# Patient Record
Sex: FEMALE | Race: OTHER | HISPANIC OR LATINO
[De-identification: names, ages, dates, MRNs, and addresses within clinical notes are randomized per-mention and may not be internally consistent; named-entity substitution may affect disease eponyms.]

---

## 2021-02-13 ENCOUNTER — TRANSCRIPTION ENCOUNTER (OUTPATIENT)
Age: 31
End: 2021-02-13

## 2021-02-14 ENCOUNTER — INPATIENT (INPATIENT)
Facility: HOSPITAL | Age: 31
LOS: 0 days | Discharge: HOME | DRG: 817 | End: 2021-02-15
Attending: OBSTETRICS & GYNECOLOGY | Admitting: OBSTETRICS & GYNECOLOGY
Payer: COMMERCIAL

## 2021-02-14 ENCOUNTER — RESULT REVIEW (OUTPATIENT)
Age: 31
End: 2021-02-14

## 2021-02-14 VITALS
SYSTOLIC BLOOD PRESSURE: 95 MMHG | TEMPERATURE: 98 F | HEART RATE: 74 BPM | DIASTOLIC BLOOD PRESSURE: 56 MMHG | WEIGHT: 293 LBS | RESPIRATION RATE: 18 BRPM

## 2021-02-14 DIAGNOSIS — K37 UNSPECIFIED APPENDICITIS: ICD-10-CM

## 2021-02-14 DIAGNOSIS — O00.90 UNSPECIFIED ECTOPIC PREGNANCY WITHOUT INTRAUTERINE PREGNANCY: ICD-10-CM

## 2021-02-14 DIAGNOSIS — Z98.890 OTHER SPECIFIED POSTPROCEDURAL STATES: Chronic | ICD-10-CM

## 2021-02-14 LAB
ALBUMIN SERPL ELPH-MCNC: 3.5 G/DL — SIGNIFICANT CHANGE UP (ref 3.5–5)
ALP SERPL-CCNC: 43 U/L — SIGNIFICANT CHANGE UP (ref 40–120)
ALT FLD-CCNC: 33 U/L DA — SIGNIFICANT CHANGE UP (ref 10–60)
ANION GAP SERPL CALC-SCNC: 13 MMOL/L — SIGNIFICANT CHANGE UP (ref 5–17)
APTT BLD: 24.7 SEC — LOW (ref 27.5–35.5)
AST SERPL-CCNC: 9 U/L — LOW (ref 10–40)
BASOPHILS # BLD AUTO: 0.05 K/UL — SIGNIFICANT CHANGE UP (ref 0–0.2)
BASOPHILS NFR BLD AUTO: 0.3 % — SIGNIFICANT CHANGE UP (ref 0–2)
BILIRUB SERPL-MCNC: 0.2 MG/DL — SIGNIFICANT CHANGE UP (ref 0.2–1.2)
BLD GP AB SCN SERPL QL: SIGNIFICANT CHANGE UP
BUN SERPL-MCNC: 14 MG/DL — SIGNIFICANT CHANGE UP (ref 7–18)
CALCIUM SERPL-MCNC: 8 MG/DL — LOW (ref 8.4–10.5)
CHLORIDE SERPL-SCNC: 105 MMOL/L — SIGNIFICANT CHANGE UP (ref 96–108)
CO2 SERPL-SCNC: 19 MMOL/L — LOW (ref 22–31)
CREAT SERPL-MCNC: 1.01 MG/DL — SIGNIFICANT CHANGE UP (ref 0.5–1.3)
EOSINOPHIL # BLD AUTO: 0.17 K/UL — SIGNIFICANT CHANGE UP (ref 0–0.5)
EOSINOPHIL NFR BLD AUTO: 1 % — SIGNIFICANT CHANGE UP (ref 0–6)
GLUCOSE SERPL-MCNC: 136 MG/DL — HIGH (ref 70–99)
HCG SERPL-ACNC: 2017 MIU/ML — HIGH
HCT VFR BLD CALC: 33.5 % — LOW (ref 34.5–45)
HGB BLD-MCNC: 11.3 G/DL — LOW (ref 11.5–15.5)
IMM GRANULOCYTES NFR BLD AUTO: 0.5 % — SIGNIFICANT CHANGE UP (ref 0–1.5)
INR BLD: 1.13 RATIO — SIGNIFICANT CHANGE UP (ref 0.88–1.16)
LYMPHOCYTES # BLD AUTO: 15.1 % — SIGNIFICANT CHANGE UP (ref 13–44)
LYMPHOCYTES # BLD AUTO: 2.56 K/UL — SIGNIFICANT CHANGE UP (ref 1–3.3)
MCHC RBC-ENTMCNC: 31.1 PG — SIGNIFICANT CHANGE UP (ref 27–34)
MCHC RBC-ENTMCNC: 33.7 GM/DL — SIGNIFICANT CHANGE UP (ref 32–36)
MCV RBC AUTO: 92.3 FL — SIGNIFICANT CHANGE UP (ref 80–100)
MONOCYTES # BLD AUTO: 0.84 K/UL — SIGNIFICANT CHANGE UP (ref 0–0.9)
MONOCYTES NFR BLD AUTO: 5 % — SIGNIFICANT CHANGE UP (ref 2–14)
NEUTROPHILS # BLD AUTO: 13.24 K/UL — HIGH (ref 1.8–7.4)
NEUTROPHILS NFR BLD AUTO: 78.1 % — HIGH (ref 43–77)
NRBC # BLD: 0 /100 WBCS — SIGNIFICANT CHANGE UP (ref 0–0)
PLATELET # BLD AUTO: 355 K/UL — SIGNIFICANT CHANGE UP (ref 150–400)
POTASSIUM SERPL-MCNC: 3.7 MMOL/L — SIGNIFICANT CHANGE UP (ref 3.5–5.3)
POTASSIUM SERPL-SCNC: 3.7 MMOL/L — SIGNIFICANT CHANGE UP (ref 3.5–5.3)
PROT SERPL-MCNC: 6.8 G/DL — SIGNIFICANT CHANGE UP (ref 6–8.3)
PROTHROM AB SERPL-ACNC: 13.4 SEC — SIGNIFICANT CHANGE UP (ref 10.6–13.6)
RBC # BLD: 3.63 M/UL — LOW (ref 3.8–5.2)
RBC # FLD: 12.7 % — SIGNIFICANT CHANGE UP (ref 10.3–14.5)
SARS-COV-2 RNA SPEC QL NAA+PROBE: SIGNIFICANT CHANGE UP
SODIUM SERPL-SCNC: 137 MMOL/L — SIGNIFICANT CHANGE UP (ref 135–145)
WBC # BLD: 16.95 K/UL — HIGH (ref 3.8–10.5)
WBC # FLD AUTO: 16.95 K/UL — HIGH (ref 3.8–10.5)

## 2021-02-14 PROCEDURE — 76817 TRANSVAGINAL US OBSTETRIC: CPT | Mod: 26

## 2021-02-14 PROCEDURE — 76801 OB US < 14 WKS SINGLE FETUS: CPT | Mod: 26

## 2021-02-14 PROCEDURE — 88305 TISSUE EXAM BY PATHOLOGIST: CPT | Mod: 26

## 2021-02-14 PROCEDURE — 99222 1ST HOSP IP/OBS MODERATE 55: CPT

## 2021-02-14 PROCEDURE — 99291 CRITICAL CARE FIRST HOUR: CPT

## 2021-02-14 RX ORDER — KETOROLAC TROMETHAMINE 30 MG/ML
30 SYRINGE (ML) INJECTION ONCE
Refills: 0 | Status: DISCONTINUED | OUTPATIENT
Start: 2021-02-14 | End: 2021-02-14

## 2021-02-14 RX ORDER — ACETAMINOPHEN 500 MG
1000 TABLET ORAL ONCE
Refills: 0 | Status: COMPLETED | OUTPATIENT
Start: 2021-02-14 | End: 2021-02-14

## 2021-02-14 RX ORDER — ONDANSETRON 8 MG/1
4 TABLET, FILM COATED ORAL ONCE
Refills: 0 | Status: COMPLETED | OUTPATIENT
Start: 2021-02-14 | End: 2021-02-14

## 2021-02-14 RX ORDER — SODIUM CHLORIDE 9 MG/ML
2000 INJECTION INTRAMUSCULAR; INTRAVENOUS; SUBCUTANEOUS ONCE
Refills: 0 | Status: COMPLETED | OUTPATIENT
Start: 2021-02-14 | End: 2021-02-14

## 2021-02-14 RX ADMIN — Medication 30 MILLIGRAM(S): at 22:22

## 2021-02-14 RX ADMIN — SODIUM CHLORIDE 4000 MILLILITER(S): 9 INJECTION INTRAMUSCULAR; INTRAVENOUS; SUBCUTANEOUS at 20:31

## 2021-02-14 RX ADMIN — ONDANSETRON 4 MILLIGRAM(S): 8 TABLET, FILM COATED ORAL at 21:43

## 2021-02-14 RX ADMIN — Medication 400 MILLIGRAM(S): at 22:22

## 2021-02-14 NOTE — ED ADULT NURSE NOTE - NSIMPLEMENTINTERV_GEN_ALL_ED
From: Juan Arriola  To: Bria Duke MD  Sent: 2/12/2020 10:08 AM [facility]  Subject: Medication Question    Hello -     I am contacting you because I AM OUT OF MY MEDICATION.     I contacted my pharmacy about refilling my Adderall XR 30 mg prescription and they have NOT received a refill request. I asked them to double-check their records before I contacted you and they were certain they have not received it. Can you please call in a refill request for my monthly Adderall XR 30 mg medication that I take once per day?     The [facility] pharmacy I use is located at [address] [place] [address], [facility], and their phone number is [phone].   Implemented All Universal Safety Interventions:  [place] to call system. Call bell, personal items and telephone within reach. Instruct patient to call for assistance. Room bathroom lighting operational. Non-slip footwear when patient is off stretcher. Physically safe environment: no spills, clutter or unnecessary equipment. Stretcher in lowest position, wheels locked, appropriate side rails in place.

## 2021-02-14 NOTE — CONSULT NOTE ADULT - SUBJECTIVE AND OBJECTIVE BOX
Patient is a 30y old  Female who presents with a chief complaint of     HPI  Called see and fei 30y.o. Female w/no significant PMH for acute abdomen r/o appendicitis. Pt presented to [facility] ER c/o worsening abdominal pain since last night. Pt states she was painting yesterday and had mozzarella sticks for dinner. When she returned home and slept, she began experiencing mild R flank pain, now more in RLQ. Associated with nausea without vomiting. Urinating normally. Last BM before coming to ER, loose. Denies fever, chills, consumption of spoiled/rotten food, previous episodes of similar symptoms. LMP about 1 month ago.     PAST MEDICAL & SURGICAL HISTORY:  Torn R wrist ligament    MEDICATIONS  (STANDING):  ondansetron Injectable 4 milliGRAM(s) IV Push once    Allergy Status Unknown    Vital Signs Last 24 Hrs  T(C): 36.7 (14 Feb 2021 18:58), Max: 36.7 (14 Feb 2021 18:58)  T(F): 98 (14 Feb 2021 18:58), Max: 98 (14 Feb 2021 18:58)  HR: 74 (14 Feb 2021 18:58) (74 - 74)  BP: 95/56 (14 Feb 2021 18:58) (95/56 - 95/56)  BP(mean): --  RR: 18 (14 Feb 2021 18:58) (18 - 18)  SpO2: --    Physical:  Gen: A&Ox3.   Abd: Soft obese, diffuse tenderness on light palpation. +rebound tenderness. No guarding or rigidity.    LABS:                        11.3   16.95 )-----------( 355      ( 14 Feb 2021 19:22 )             33.5              02-14    137  |  105  |  14  ----------------------------<  136<H>  3.7   |  19<L>  |  1.01    Ca    8.0<L>      14 Feb 2021 19:22    TPro  6.8  /  Alb  3.5  /  TBili  0.2  /  DBili  x   /  AST  9<L>  /  ALT  33  /  AlkPhos  43  02-14            PT/INR - ( 14 Feb 2021 19:22 )   PT: 13.4 sec;   INR: 1.13 ratio    PTT - ( 14 Feb 2021 19:22 )  PTT:24.7 sec    RADIOLOGY & ADDITIONAL STUDIES:  < from: US Transvaginal (02.14.21 @ 20:16) >  No intrauterine or extrauterine gestation identified. Nonspecific small subcentimeter left paraovarian cyst. Differential diagnosis includes early normal pregnancy, occult ectopic pregnancy and failed pregnancy. Continued trending of hCG and short-term follow-up imaging is recommended .    < end of copied text >    
29yo p2  developed acute onset rlq pain...describes it as severe  hx chlamydia in the past  menses reg  [facility]  sono=no iug seen  wanted pregnancy    abdomen is nondistended but there is rebound tenderness that is severe and located in the rlq.  there is tenderness to mild palpation rlq  pelvic exam:  no bleeding  severe cmt noted  markedly tender r adnexa=unable to evaluate further due to pain.  sono did not report free fluid.    wbc=16  afebrile  no cva tenderness    ASSESEMENT:  ACUTE ONSET RLQ PAIN, 5 WEEKS GESTATION WITH ACCURATE DATES  [facility] [place] NO VISUALIZ[place]ION OF IUG  SEVERE RLQ PAIN  WBC 16  SHE DENIES ANOREXIA. ABD PAIN DID NOT START IN PERIUMBILICAL AREA    PLAN:  BASED ON SYMPTOMS AND CLINICAL EXAMINATION PATIENT LIKELY HAS AN ECTOPIC PREGNANCY OR APPENDICITIS. DUE TO DESIRE TO KEEP PREGNANCY A CT SCAN IS CONRTRAINDICATED. I COUNSELED PT ON FINDINGS SIGNIFICANCE MGMT OPTIONS AND RESPECTIVE PROS CONS RISKS LIMITATIONS.  PLAN IS TO PROCEED TO LAPAROSCOPY. IF ECTOPIC=SALPINGECTOMY, IF APPENDICITIS=APPENDECTOMY.  A SURGICAL CONSULT REQUESTED PREOP.  INFORMED CONSENT OBTAINED FROM PATIENT AFTER ALL QUESTIONS ANSWERED TO HER SATISFACTION.

## 2021-02-14 NOTE — CONSULT NOTE ADULT - ASSESSMENT
30 y.o. F with acute abdomen, r/o acute appendicitis    -Unable to obtain CT due to risk to fetus. Pt wants to continue with pregnancy  -MRI unavailable at present time  -OB to take pt to OR for dx lap. Will be on board in case of appendicitis finding  -Keep NPO  -IVF  -Empiric abx  -Pain control prn  -Antiemesis  -COVID testing

## 2021-02-14 NOTE — H&P ADULT - HISTORY OF PRESENT ILLNESS
29yo  LMP 2021 EGA 5.1wks presents to ER after LOC at home. Pt states she had RLQ pain started last night which was mild but today at 4pm increased in intensity 10/10 in severity radiating to her back. Pt denies any vb, vaginal discharge; pelvic pain, cp, sob, dizziness, palpitations, n/v/d/c, fever; chills     pob/gynhx:  x2; sab x1; +chlamy tx'd  no abn pap smear; no fibroids; reg menses  pmhx: Anxiety    pshx: right wrist sx  all: nka  meds: none  sochx: no etoh/drug/tobacco use    REVIEW OF SYSTEMS: see HPI	    PE:  Vital Signs Last 24 Hrs  T(C): 36.7 (2021 18:58), Max: 36.7 (2021 18:58)  T(F): 98 (2021 18:58), Max: 98 (2021 18:58)  HR: 74 (2021 18:58) (74 - 74)  BP: 95/56 (2021 18:58) (95/56 - 95/56)  BP(mean): --  RR: 18 (2021 18:58) (18 - 18)  SpO2: --    abd: +bs; soft, +tenderness to light and deep palpation, ND, +rebound, no guarding  pelvis:  done by Dr Rucker; no vb observed    LABS:                        11.3   16.95 )-----------( 355      ( 2021 19:22 )             33.5     02-14    137  |  105  |  14  ----------------------------<  136<H>  3.7   |  19<L>  |  1.01    Ca    8.0<L>      2021 19:22    TPro  6.8  /  Alb  3.5  /  TBili  0.2  /  DBili  x   /  AST  9<L>  /  ALT  33  /  AlkPhos  43  02-14    PT/INR - ( 2021 19:22 )   PT: 13.4 sec;   INR: 1.13 ratio         PTT - ( 2021 19:22 )  PTT:24.7 sec    HCG Quantitative, Serum (21 @ 19:22)    HCG Quantitative, Serum: 2017: HCG-Quantitative test interpretations: This test is intended only for the  detection & monitoring of pregnancy. For oncology studies order the tumor  marker test “HCG-TM” (hCG-Tumor Marker).  For pregnancy evaluation the reference values are as follows:  Negative:  <=4 mIU/mL  Indeterminate:  5 - 25 mIU/mL (suggest repeat testing in 72 hours)  Positive:  > 25 mIU/mL  Reference Values during Pregnancy:  Weeks after LMP* REFERENCE INTERVAL mIU/mL     3      6 - 71     4      10 - 750     5      220 - 7,100     6      160 - 32,000     7      3,700 - 164,000     8      32,000 - 150,000     9      64,000 - 151,000     10      47,000 - 187,000     12      28,000 - 211,000     14      14,000 - 63,000     15      12,000 - 71,000     16 - 18  8,000 - 58,000  * LMP:  Last Menstrual Period  HCG results of false positive and false negative for pregnancy are rare,  but can occur with this, and other, hCG tests. Lina- and post-menopausal  females may have mildly elevated hCG concentrations usually less than 14  mIU/mL that are constant over time. mIU/mL        RADIOLOGY & ADDITIONAL STUDIES:  sono    < from: US Transvaginal (21 @ 20:16) >  EXAM:  US TRANSVAGINAL                          EXAM:  US OB LES THAN 14 WKS 1ST GEST                            PROCEDURE DATE:  2021          INTERPRETATION:  CLINICAL INFORMATION: Lower abdominal pain, pregnant. Evaluate for ectopic pregnancy. Beta hCG equals 2017.    LMP: 2021    Estimated Gestational Age by LMP: 5 weeks 1 day    COMPARISON: 2011    Endovaginal and transabdominal pelvic sonogram.    FINDINGS:  Uterus: 10.6 x 6.8 x 6.5 cm. Endometrium measures 9 mm in thickness. No intrauterine gestational sac is identified. Small nabothian cyst noted in the cervix.    Right ovary: 2.8 x 2.0 x 2.2 cm. Within normal limits. Normal arterial and venous waveforms.  Left ovary: 3.2 x 1.8 x 1.7 cm. 1.4 cm corpus luteum. 0.7 cm paraovarian cyst.    Fluid: None.    IMPRESSION:  No intrauterine or extrauterine gestation identified. Nonspecific small subcentimeter left paraovarian cyst. Differential diagnosis includes early normal pregnancy, occult ectopic pregnancy and failed pregnancy. Continued trending of hCG and short-term follow-up imaging is recommended .      < end of copied text >

## 2021-02-14 NOTE — ED PROVIDER NOTE - OBJECTIVE STATEMENT
30 y.o female with no PMhx and no PSHx , , unknown LMP , approximately 2021, reports to the ED c.o R sided abdominal pain since yesterday. patient states she went to urgent care today and diagnosed with UTI, patient states she stood up at home and passed out . Patient endorses abdominal pain has worsened. Patient denies any other acute complaints. NKDA

## 2021-02-14 NOTE — CONSULT NOTE ADULT - ATTENDING COMMENTS
I WROTE THIS NOTE
plan for the OR by GYN  Had gone to the operating room to r/o AP  Intraop - ruptured ectopic

## 2021-02-14 NOTE — H&P ADULT - PROBLEM SELECTOR PLAN 2
admit to GYN  obtain surgical consult  no CT 2/2 pt wanting pregnancy   consent obtained in chart pt agrees  Dr Rucker at bedside examined patient   OR team notified by nursing supervisor  anesthesia aware

## 2021-02-14 NOTE — ED ADULT NURSE NOTE - CHIEF COMPLAINT QUOTE
" I have the lower abdominal pain from today  I had the positive pregnancy test  I passed out at home "

## 2021-02-14 NOTE — H&P ADULT - ASSESSMENT
31yo  @5.1wks by LMP strongly desires pregnancy w RLQ pain; has an acute abdomen; r/o ectopic vs acute appendicitis

## 2021-02-14 NOTE — ED ADULT TRIAGE NOTE - CHIEF COMPLAINT QUOTE
" I have the lower abdominal pain from today  I had the positive pregnancy test " " I have the lower abdominal pain from today  I had the positive pregnancy test  I passed out at home "

## 2021-02-15 ENCOUNTER — TRANSCRIPTION ENCOUNTER (OUTPATIENT)
Age: 31
End: 2021-02-15

## 2021-02-15 VITALS
HEART RATE: 103 BPM | RESPIRATION RATE: 17 BRPM | TEMPERATURE: 100 F | OXYGEN SATURATION: 100 % | SYSTOLIC BLOOD PRESSURE: 103 MMHG | DIASTOLIC BLOOD PRESSURE: 64 MMHG

## 2021-02-15 DIAGNOSIS — D62 ACUTE POSTHEMORRHAGIC ANEMIA: ICD-10-CM

## 2021-02-15 DIAGNOSIS — Z98.890 OTHER SPECIFIED POSTPROCEDURAL STATES: ICD-10-CM

## 2021-02-15 LAB
ANION GAP SERPL CALC-SCNC: 10 MMOL/L — SIGNIFICANT CHANGE UP (ref 5–17)
BASOPHILS # BLD AUTO: 0.01 K/UL — SIGNIFICANT CHANGE UP (ref 0–0.2)
BASOPHILS NFR BLD AUTO: 0.1 % — SIGNIFICANT CHANGE UP (ref 0–2)
BUN SERPL-MCNC: 5 MG/DL — LOW (ref 7–18)
CALCIUM SERPL-MCNC: 7.2 MG/DL — LOW (ref 8.4–10.5)
CHLORIDE SERPL-SCNC: 109 MMOL/L — HIGH (ref 96–108)
CO2 SERPL-SCNC: 20 MMOL/L — LOW (ref 22–31)
CREAT SERPL-MCNC: 0.77 MG/DL — SIGNIFICANT CHANGE UP (ref 0.5–1.3)
EOSINOPHIL # BLD AUTO: 0 K/UL — SIGNIFICANT CHANGE UP (ref 0–0.5)
EOSINOPHIL NFR BLD AUTO: 0 % — SIGNIFICANT CHANGE UP (ref 0–6)
GLUCOSE SERPL-MCNC: 144 MG/DL — HIGH (ref 70–99)
HCT VFR BLD CALC: 24.8 % — LOW (ref 34.5–45)
HCT VFR BLD CALC: 25.9 % — LOW (ref 34.5–45)
HGB BLD-MCNC: 8.7 G/DL — LOW (ref 11.5–15.5)
HGB BLD-MCNC: 8.9 G/DL — LOW (ref 11.5–15.5)
IMM GRANULOCYTES NFR BLD AUTO: 0.5 % — SIGNIFICANT CHANGE UP (ref 0–1.5)
LYMPHOCYTES # BLD AUTO: 1.08 K/UL — SIGNIFICANT CHANGE UP (ref 1–3.3)
LYMPHOCYTES # BLD AUTO: 10 % — LOW (ref 13–44)
MCHC RBC-ENTMCNC: 31.1 PG — SIGNIFICANT CHANGE UP (ref 27–34)
MCHC RBC-ENTMCNC: 31.9 PG — SIGNIFICANT CHANGE UP (ref 27–34)
MCHC RBC-ENTMCNC: 34.4 GM/DL — SIGNIFICANT CHANGE UP (ref 32–36)
MCHC RBC-ENTMCNC: 35.1 GM/DL — SIGNIFICANT CHANGE UP (ref 32–36)
MCV RBC AUTO: 90.6 FL — SIGNIFICANT CHANGE UP (ref 80–100)
MCV RBC AUTO: 90.8 FL — SIGNIFICANT CHANGE UP (ref 80–100)
MONOCYTES # BLD AUTO: 0.41 K/UL — SIGNIFICANT CHANGE UP (ref 0–0.9)
MONOCYTES NFR BLD AUTO: 3.8 % — SIGNIFICANT CHANGE UP (ref 2–14)
NEUTROPHILS # BLD AUTO: 9.21 K/UL — HIGH (ref 1.8–7.4)
NEUTROPHILS NFR BLD AUTO: 85.6 % — HIGH (ref 43–77)
NRBC # BLD: 0 /100 WBCS — SIGNIFICANT CHANGE UP (ref 0–0)
NRBC # BLD: 0 /100 WBCS — SIGNIFICANT CHANGE UP (ref 0–0)
PLATELET # BLD AUTO: 191 K/UL — SIGNIFICANT CHANGE UP (ref 150–400)
PLATELET # BLD AUTO: 200 K/UL — SIGNIFICANT CHANGE UP (ref 150–400)
POTASSIUM SERPL-MCNC: 3.4 MMOL/L — LOW (ref 3.5–5.3)
POTASSIUM SERPL-SCNC: 3.4 MMOL/L — LOW (ref 3.5–5.3)
RBC # BLD: 2.73 M/UL — LOW (ref 3.8–5.2)
RBC # BLD: 2.86 M/UL — LOW (ref 3.8–5.2)
RBC # FLD: 13.2 % — SIGNIFICANT CHANGE UP (ref 10.3–14.5)
RBC # FLD: 13.7 % — SIGNIFICANT CHANGE UP (ref 10.3–14.5)
SARS-COV-2 IGG SERPL QL IA: POSITIVE
SARS-COV-2 IGM SERPL IA-ACNC: 7.96 INDEX — HIGH
SODIUM SERPL-SCNC: 139 MMOL/L — SIGNIFICANT CHANGE UP (ref 135–145)
WBC # BLD: 10.76 K/UL — HIGH (ref 3.8–10.5)
WBC # BLD: 11.41 K/UL — HIGH (ref 3.8–10.5)
WBC # FLD AUTO: 10.76 K/UL — HIGH (ref 3.8–10.5)
WBC # FLD AUTO: 11.41 K/UL — HIGH (ref 3.8–10.5)

## 2021-02-15 PROCEDURE — 86769 SARS-COV-2 COVID-19 ANTIBODY: CPT

## 2021-02-15 PROCEDURE — 85610 PROTHROMBIN TIME: CPT

## 2021-02-15 PROCEDURE — U0005: CPT

## 2021-02-15 PROCEDURE — 86901 BLOOD TYPING SEROLOGIC RH(D): CPT

## 2021-02-15 PROCEDURE — 80048 BASIC METABOLIC PNL TOTAL CA: CPT

## 2021-02-15 PROCEDURE — 80053 COMPREHEN METABOLIC PANEL: CPT

## 2021-02-15 PROCEDURE — P9040: CPT

## 2021-02-15 PROCEDURE — 86850 RBC ANTIBODY SCREEN: CPT

## 2021-02-15 PROCEDURE — 85025 COMPLETE CBC W/AUTO DIFF WBC: CPT

## 2021-02-15 PROCEDURE — 93005 ELECTROCARDIOGRAM TRACING: CPT

## 2021-02-15 PROCEDURE — 36430 TRANSFUSION BLD/BLD COMPNT: CPT

## 2021-02-15 PROCEDURE — 99285 EMERGENCY DEPT VISIT HI MDM: CPT | Mod: 25

## 2021-02-15 PROCEDURE — 85027 COMPLETE CBC AUTOMATED: CPT

## 2021-02-15 PROCEDURE — 85730 THROMBOPLASTIN TIME PARTIAL: CPT

## 2021-02-15 PROCEDURE — 76830 TRANSVAGINAL US NON-OB: CPT

## 2021-02-15 PROCEDURE — 86923 COMPATIBILITY TEST ELECTRIC: CPT

## 2021-02-15 PROCEDURE — 76801 OB US < 14 WKS SINGLE FETUS: CPT

## 2021-02-15 PROCEDURE — 86900 BLOOD TYPING SEROLOGIC ABO: CPT

## 2021-02-15 PROCEDURE — 36415 COLL VENOUS BLD VENIPUNCTURE: CPT

## 2021-02-15 PROCEDURE — 87635 SARS-COV-2 COVID-19 AMP PRB: CPT

## 2021-02-15 PROCEDURE — 88305 TISSUE EXAM BY PATHOLOGIST: CPT

## 2021-02-15 PROCEDURE — 84702 CHORIONIC GONADOTROPIN TEST: CPT

## 2021-02-15 RX ORDER — HYDROMORPHONE HYDROCHLORIDE 2 MG/ML
1 INJECTION INTRAMUSCULAR; INTRAVENOUS; SUBCUTANEOUS
Refills: 0 | Status: DISCONTINUED | OUTPATIENT
Start: 2021-02-15 | End: 2021-02-15

## 2021-02-15 RX ORDER — HYDROMORPHONE HYDROCHLORIDE 2 MG/ML
0.5 INJECTION INTRAMUSCULAR; INTRAVENOUS; SUBCUTANEOUS
Refills: 0 | Status: DISCONTINUED | OUTPATIENT
Start: 2021-02-15 | End: 2021-02-15

## 2021-02-15 RX ORDER — SIMETHICONE 80 MG/1
1 TABLET, CHEWABLE ORAL
Qty: 14 | Refills: 0
Start: 2021-02-15 | End: 2021-02-21

## 2021-02-15 RX ORDER — FERROUS SULFATE 325(65) MG
1 TABLET ORAL
Qty: 60 | Refills: 0
Start: 2021-02-15 | End: 2021-03-16

## 2021-02-15 RX ORDER — PANTOPRAZOLE SODIUM 20 MG/1
40 TABLET, DELAYED RELEASE ORAL ONCE
Refills: 0 | Status: COMPLETED | OUTPATIENT
Start: 2021-02-15 | End: 2021-02-15

## 2021-02-15 RX ORDER — ACETAMINOPHEN 500 MG
975 TABLET ORAL EVERY 6 HOURS
Refills: 0 | Status: DISCONTINUED | OUTPATIENT
Start: 2021-02-15 | End: 2021-02-15

## 2021-02-15 RX ORDER — POTASSIUM CHLORIDE 20 MEQ
40 PACKET (EA) ORAL ONCE
Refills: 0 | Status: COMPLETED | OUTPATIENT
Start: 2021-02-15 | End: 2021-02-15

## 2021-02-15 RX ORDER — IBUPROFEN 200 MG
1 TABLET ORAL
Qty: 120 | Refills: 0
Start: 2021-02-15 | End: 2021-03-16

## 2021-02-15 RX ORDER — SIMETHICONE 80 MG/1
80 TABLET, CHEWABLE ORAL EVERY 6 HOURS
Refills: 0 | Status: DISCONTINUED | OUTPATIENT
Start: 2021-02-15 | End: 2021-02-15

## 2021-02-15 RX ORDER — ONDANSETRON 8 MG/1
4 TABLET, FILM COATED ORAL ONCE
Refills: 0 | Status: DISCONTINUED | OUTPATIENT
Start: 2021-02-15 | End: 2021-02-15

## 2021-02-15 RX ORDER — FERROUS SULFATE 325(65) MG
325 TABLET ORAL
Refills: 0 | Status: DISCONTINUED | OUTPATIENT
Start: 2021-02-15 | End: 2021-02-15

## 2021-02-15 RX ORDER — ASCORBIC ACID 60 MG
1 TABLET,CHEWABLE ORAL
Qty: 30 | Refills: 0
Start: 2021-02-15 | End: 2021-03-16

## 2021-02-15 RX ORDER — SODIUM CHLORIDE 9 MG/ML
1000 INJECTION, SOLUTION INTRAVENOUS
Refills: 0 | Status: DISCONTINUED | OUTPATIENT
Start: 2021-02-15 | End: 2021-02-15

## 2021-02-15 RX ORDER — ONDANSETRON 8 MG/1
4 TABLET, FILM COATED ORAL EVERY 4 HOURS
Refills: 0 | Status: DISCONTINUED | OUTPATIENT
Start: 2021-02-15 | End: 2021-02-15

## 2021-02-15 RX ORDER — IBUPROFEN 200 MG
600 TABLET ORAL EVERY 6 HOURS
Refills: 0 | Status: DISCONTINUED | OUTPATIENT
Start: 2021-02-15 | End: 2021-02-15

## 2021-02-15 RX ORDER — KETOROLAC TROMETHAMINE 30 MG/ML
30 SYRINGE (ML) INJECTION ONCE
Refills: 0 | Status: DISCONTINUED | OUTPATIENT
Start: 2021-02-15 | End: 2021-02-15

## 2021-02-15 RX ORDER — MORPHINE SULFATE 50 MG/1
4 CAPSULE, EXTENDED RELEASE ORAL EVERY 4 HOURS
Refills: 0 | Status: DISCONTINUED | OUTPATIENT
Start: 2021-02-15 | End: 2021-02-15

## 2021-02-15 RX ORDER — KETOROLAC TROMETHAMINE 30 MG/ML
30 SYRINGE (ML) INJECTION EVERY 6 HOURS
Refills: 0 | Status: DISCONTINUED | OUTPATIENT
Start: 2021-02-15 | End: 2021-02-15

## 2021-02-15 RX ORDER — SENNOSIDES/DOCUSATE SODIUM 8.6MG-50MG
2 TABLET ORAL
Qty: 60 | Refills: 0
Start: 2021-02-15 | End: 2021-03-16

## 2021-02-15 RX ORDER — ZOLPIDEM TARTRATE 10 MG/1
5 TABLET ORAL AT BEDTIME
Refills: 0 | Status: DISCONTINUED | OUTPATIENT
Start: 2021-02-15 | End: 2021-02-15

## 2021-02-15 RX ADMIN — Medication 30 MILLIGRAM(S): at 01:43

## 2021-02-15 RX ADMIN — MORPHINE SULFATE 4 MILLIGRAM(S): 50 CAPSULE, EXTENDED RELEASE ORAL at 02:55

## 2021-02-15 RX ADMIN — Medication 325 MILLIGRAM(S): at 10:42

## 2021-02-15 RX ADMIN — Medication 975 MILLIGRAM(S): at 10:42

## 2021-02-15 RX ADMIN — PANTOPRAZOLE SODIUM 40 MILLIGRAM(S): 20 TABLET, DELAYED RELEASE ORAL at 10:42

## 2021-02-15 RX ADMIN — Medication 30 MILLIGRAM(S): at 13:35

## 2021-02-15 RX ADMIN — SIMETHICONE 80 MILLIGRAM(S): 80 TABLET, CHEWABLE ORAL at 11:17

## 2021-02-15 RX ADMIN — Medication 600 MILLIGRAM(S): at 06:46

## 2021-02-15 RX ADMIN — SIMETHICONE 80 MILLIGRAM(S): 80 TABLET, CHEWABLE ORAL at 13:36

## 2021-02-15 RX ADMIN — Medication 40 MILLIEQUIVALENT(S): at 13:36

## 2021-02-15 NOTE — CHART NOTE - NSCHARTNOTEFT_GEN_A_CORE
Pt seen at bedside c/o back spasms. Denies n/v, cp; sob; palpitations; or dizziness    T(C): 37.2 (02-15-21 @ 05:42), Max: 37.5 (02-15-21 @ 02:32)  HR: 107 (02-15-21 @ 05:42) (74 - 107)  BP: 95/57 (02-15-21 @ 05:42) (90/51 - 108/47)  RR: 18 (02-15-21 @ 05:42) (18 - 24)  SpO2: 100% (02-15-21 @ 05:42) (98% - 100%)    ; Hemoperitoneum 2000    abd: soft/nt, band  aids in place C/D/I  pelvic: no vb  ext: venodynes in place; no calf pain    a/p POD #0 s/p exploratory laparotomy removal of right salpingectomy and evacuation of hemoperitoneum  s/p 2units PRBC  f/u cbc @6am post transfusion  cont close monitor   cont post op care  d/w  [place]

## 2021-02-15 NOTE — DISCHARGE NOTE PROVIDER - CARE PROVIDER_API CALL
Ever Rucker)  Obstetrics and Gynecology  [id number] [address]  Phone: [phone]  Fax: [phone]  Follow Up Time:

## 2021-02-15 NOTE — DISCHARGE NOTE NURSING/CASE MANAGEMENT/SOCIAL WORK - NSDCPNINST_GEN_ALL_CORE
may remove the dressing in 48 hours after surgery, leave the white steristrips underneath, it will fall out by itself until seen by Surgeon. May wet during shower. No heavy Lifting and Pushing. Notify Surgeon if you started having fever, chills, severe pain not being relieved with pain medication, excessive vaginal bleeding. Follow up with Surgeon.  may remove the dressing tomorrow leave the white steristrips underneath, it will fall out by itself until seen by Surgeon. May wet during shower. No heavy Lifting and Pushing. Notify Surgeon if you started having fever, chills, severe pain not being relieved with pain medication, excessive vaginal bleeding. Follow up with Surgeon.

## 2021-02-15 NOTE — DISCHARGE NOTE PROVIDER - NSDCMRMEDTOKEN_GEN_ALL_CORE_FT
ferrous sulfate 325 mg (65 mg elemental iron) oral tablet: 1 tab(s) orally 2 times a day   Gas-X Ultra Softgels 180 mg oral capsule: 1 cap(s) orally 2 times a day   ibuprofen 600 mg oral tablet: 1 tab(s) orally every 6 hours   Lina-Colace 50 mg-8.6 mg oral tablet: 2 tab(s) orally once a day (at bedtime)   Vitamin C 500 mg oral tablet: 1 tab(s) orally once a day

## 2021-02-15 NOTE — CHART NOTE - NSCHARTNOTEFT_GEN_A_CORE
GYN PA Focused POD#0    Patient offers no complaints at this time. tolerating regular diet, voiding.   h/h 8.7/24.8   K 3.4    plan  give KCl 40meq PO  dc home  f.u with Dr. Rucker in 2 weeks for postop check  discharge instructions verbalized to the patient and understood by the patient  suma Rucker

## 2021-02-15 NOTE — DISCHARGE NOTE PROVIDER - HOSPITAL COURSE
Patient presented to the ER with RLQ and hypotension. Found to be pregnant  taken to OR for diagnostic laparoscopy, found to have blood in her abdomen and due to ruptured ectopic pregnancy  s/p right salpingectomy, removal of R ectopic, evacuation of hemoperitoneum  with acute blood loss anemia s/p blood transfusion intraop   postop stable

## 2021-02-15 NOTE — DISCHARGE NOTE PROVIDER - NSDCCPCAREPLAN_GEN_ALL_CORE_FT
PRINCIPAL DISCHARGE DIAGNOSIS  Diagnosis: Ectopic pregnancy  Assessment and Plan of Treatment:

## 2021-02-15 NOTE — DISCHARGE NOTE NURSING/CASE MANAGEMENT/SOCIAL WORK - PATIENT PORTAL LINK FT
You can access the FollowMyHealth Patient Portal offered by [facility] by registering at the following website: [email or web address]. By joining [employer]’s FollowMyHealth portal, you will also be able to view your health information using other applications (apps) compatible with our system.

## 2021-02-15 NOTE — DISCHARGE NOTE PROVIDER - NSDCFUADDAPPT_GEN_ALL_CORE_FT
no sex nothing in vagina no heavy lifting no pushing eat high fiber food ambulation daily as tolerated shower daily clean wound well and keep dry after; see your gynecologist in 2wks for follow up   no sex nothing in vagina no heavy lifting no pushing eat high fiber food ambulation daily as tolerated shower daily clean wound well and keep dry after; see your gynecologist in 2wks for follow up  remove dressing tomorrow morning

## 2021-02-15 NOTE — DISCHARGE NOTE PROVIDER - NSDCCPTREATMENT_GEN_ALL_CORE_FT
PRINCIPAL PROCEDURE  Procedure: Laparoscopy, diagnostic  Findings and Treatment: no sex nothing in vagina no heavy lifting no pushing eat high fiber food ambulation daily as tolerated shower daily clean wound well and keep dry after; see your gynecologist in 2wks for follow up        SECONDARY PROCEDURE  Procedure: Right salpingectomy  Findings and Treatment: removal of ectopic

## 2021-02-15 NOTE — PROGRESS NOTE ADULT - SUBJECTIVE AND OBJECTIVE BOX
GYN PA Progress Note POD#0    Patient seen and reevaluated at bedside, offers no complaints at this time, severino just removed due to void. Denies dizziness, palpitations, SOB, CP, diaphoresis, worsening abdominal pain.    Vital Signs Last 24 Hrs  T(C): 37.2 (15 Feb 2021 05:42), Max: 37.5 (15 Feb 2021 02:32)  T(F): 98.9 (15 Feb 2021 05:42), Max: 99.5 (15 Feb 2021 02:32)  HR: 107 (15 Feb 2021 05:42) (74 - 107)  BP: 95/57 (15 Feb 2021 05:42) (90/51 - 108/47)  BP(mean): 68 (15 Feb 2021 01:45) (61 - 69)  RR: 18 (15 Feb 2021 05:42) (18 - 24)  SpO2: 100% (15 Feb 2021 05:42) (98% - 100%)    gen aox3, not in acute distress, appears well  abd BSx4, soft, nondistended, no guarding, no rebound, dressing c/d/i  gyn no VB

## 2021-02-15 NOTE — DISCHARGE NOTE NURSING/CASE MANAGEMENT/SOCIAL WORK - NSDCFUADDAPPT_GEN_ALL_CORE_FT
no sex nothing in vagina no heavy lifting no pushing eat high fiber food ambulation daily as tolerated shower daily clean wound well and keep dry after; see your gynecologist in 2wks for follow up  remove dressing tomorrow morning

## 2021-02-15 NOTE — DISCHARGE NOTE PROVIDER - CARE PROVIDERS DIRECT ADDRESSES
Patient advised, reviewed the importance of monitoring for s/sy of infection, verbalized understanding.   ,DirectAddress_Unknown

## 2021-02-15 NOTE — PROGRESS NOTE ADULT - ASSESSMENT
a/p 29y/o s/p diagnostic laparoscopy R salpingectomy for R ectopic pregnancy with evacuation of hemoperitoneum sp 2units PRBCs stable  rpt bmp/cbc   f/u void  regular diet  pain mngt  ambulation/incentive spirometry  discharge planning for today  suma Rucker

## 2021-02-19 LAB — SURGICAL PATHOLOGY STUDY: SIGNIFICANT CHANGE UP

## 2022-02-22 PROBLEM — F41.9 ANXIETY DISORDER, UNSPECIFIED: Chronic | Status: ACTIVE | Noted: 2021-02-14

## 2022-03-10 ENCOUNTER — LABORATORY RESULT (OUTPATIENT)
Age: 32
End: 2022-03-10

## 2022-03-10 ENCOUNTER — APPOINTMENT (OUTPATIENT)
Dept: OBGYN | Facility: CLINIC | Age: 32
End: 2022-03-10
Payer: COMMERCIAL

## 2022-03-10 ENCOUNTER — ASOB RESULT (OUTPATIENT)
Age: 32
End: 2022-03-10

## 2022-03-10 VITALS
DIASTOLIC BLOOD PRESSURE: 68 MMHG | BODY MASS INDEX: 28.16 KG/M2 | HEART RATE: 86 BPM | WEIGHT: 153 LBS | HEIGHT: 62 IN | SYSTOLIC BLOOD PRESSURE: 101 MMHG

## 2022-03-10 DIAGNOSIS — Z87.59 PERSONAL HISTORY OF OTHER COMPLICATIONS OF PREGNANCY, CHILDBIRTH AND THE PUERPERIUM: ICD-10-CM

## 2022-03-10 DIAGNOSIS — Z80.3 FAMILY HISTORY OF MALIGNANT NEOPLASM OF BREAST: ICD-10-CM

## 2022-03-10 DIAGNOSIS — Z78.9 OTHER SPECIFIED HEALTH STATUS: ICD-10-CM

## 2022-03-10 PROCEDURE — 99203 OFFICE O/P NEW LOW 30 MIN: CPT

## 2022-03-10 PROCEDURE — 76817 TRANSVAGINAL US OBSTETRIC: CPT

## 2022-03-12 PROBLEM — Z78.9 DOES NOT USE ILLICIT DRUGS: Status: ACTIVE | Noted: 2022-03-12

## 2022-03-12 PROBLEM — Z87.59 HISTORY OF ECTOPIC PREGNANCY: Status: RESOLVED | Noted: 2022-03-12 | Resolved: 2022-03-12

## 2022-03-12 PROBLEM — Z78.9 NEVER SMOKED TOBACCO: Status: ACTIVE | Noted: 2022-03-12

## 2022-03-12 PROBLEM — Z80.3 FAMILY HISTORY OF BREAST CANCER: Status: ACTIVE | Noted: 2022-03-12

## 2022-03-12 PROBLEM — Z78.9 DENIES ALCOHOL CONSUMPTION: Status: ACTIVE | Noted: 2022-03-12

## 2022-03-12 LAB
ABO + RH PNL BLD: NORMAL
BACTERIA UR CULT: NORMAL
BASOPHILS # BLD AUTO: 0.02 K/UL
BASOPHILS NFR BLD AUTO: 0.2 %
BLD GP AB SCN SERPL QL: NORMAL
C TRACH RRNA SPEC QL NAA+PROBE: NOT DETECTED
CMV IGG SERPL QL: >10 U/ML
CMV IGG SERPL-IMP: POSITIVE
CMV IGM SERPL QL: <8 AU/ML
CMV IGM SERPL QL: NEGATIVE
EOSINOPHIL # BLD AUTO: 0.09 K/UL
EOSINOPHIL NFR BLD AUTO: 1 %
HBV SURFACE AG SER QL: NONREACTIVE
HCT VFR BLD CALC: 37.3 %
HCV AB SER QL: NONREACTIVE
HCV S/CO RATIO: 0.13 S/CO
HGB A MFR BLD: 97.1 %
HGB A2 MFR BLD: 2.9 %
HGB BLD-MCNC: 12.3 G/DL
HGB FRACT BLD-IMP: NORMAL
HIV1+2 AB SPEC QL IA.RAPID: NONREACTIVE
IMM GRANULOCYTES NFR BLD AUTO: 0.2 %
LEAD BLD-MCNC: <1 UG/DL
LYMPHOCYTES # BLD AUTO: 2.02 K/UL
LYMPHOCYTES NFR BLD AUTO: 23.5 %
MAN DIFF?: NORMAL
MCHC RBC-ENTMCNC: 31.1 PG
MCHC RBC-ENTMCNC: 33 GM/DL
MCV RBC AUTO: 94.2 FL
MONOCYTES # BLD AUTO: 0.6 K/UL
MONOCYTES NFR BLD AUTO: 7 %
N GONORRHOEA RRNA SPEC QL NAA+PROBE: NOT DETECTED
NEUTROPHILS # BLD AUTO: 5.84 K/UL
NEUTROPHILS NFR BLD AUTO: 68.1 %
PLATELET # BLD AUTO: 349 K/UL
RBC # BLD: 3.96 M/UL
RBC # FLD: 13.1 %
RUBV IGG FLD-ACNC: 0.8 INDEX
RUBV IGG SER-IMP: NEGATIVE
SOURCE AMPLIFICATION: NORMAL
T PALLIDUM AB SER QL IA: NEGATIVE
VZV AB TITR SER: POSITIVE
VZV IGG SER IF-ACNC: 1087 INDEX
WBC # FLD AUTO: 8.59 K/UL

## 2022-03-14 ENCOUNTER — NON-APPOINTMENT (OUTPATIENT)
Age: 32
End: 2022-03-14

## 2022-04-07 LAB
AR GENE MUT ANL BLD/T: NORMAL
B19V IGG SER QL IA: 0.6 INDEX
B19V IGG+IGM SER-IMP: NEGATIVE
B19V IGG+IGM SER-IMP: NORMAL
B19V IGM FLD-ACNC: 0.1 INDEX
B19V IGM SER-ACNC: NEGATIVE
CFTR MUT TESTED BLD/T: NEGATIVE
FMR1 GENE MUT ANL BLD/T: NORMAL

## 2022-04-12 ENCOUNTER — APPOINTMENT (OUTPATIENT)
Dept: OBGYN | Facility: CLINIC | Age: 32
End: 2022-04-12
Payer: COMMERCIAL

## 2022-04-12 ENCOUNTER — TRANSCRIPTION ENCOUNTER (OUTPATIENT)
Age: 32
End: 2022-04-12

## 2022-04-12 VITALS
BODY MASS INDEX: 28.34 KG/M2 | DIASTOLIC BLOOD PRESSURE: 75 MMHG | HEART RATE: 85 BPM | WEIGHT: 154 LBS | HEIGHT: 62 IN | SYSTOLIC BLOOD PRESSURE: 113 MMHG

## 2022-04-12 PROCEDURE — 0502F SUBSEQUENT PRENATAL CARE: CPT

## 2022-04-12 RX ORDER — NORGESTIMATE AND ETHINYL ESTRADIOL 0.25-0.035
0.25-35 KIT ORAL
Qty: 84 | Refills: 0 | Status: DISCONTINUED | COMMUNITY
Start: 2021-06-18

## 2022-04-19 ENCOUNTER — NON-APPOINTMENT (OUTPATIENT)
Age: 32
End: 2022-04-19

## 2022-05-06 ENCOUNTER — ASOB RESULT (OUTPATIENT)
Age: 32
End: 2022-05-06

## 2022-05-06 ENCOUNTER — APPOINTMENT (OUTPATIENT)
Dept: ANTEPARTUM | Facility: CLINIC | Age: 32
End: 2022-05-06
Payer: COMMERCIAL

## 2022-05-06 PROCEDURE — 76811 OB US DETAILED SNGL FETUS: CPT

## 2022-05-10 ENCOUNTER — APPOINTMENT (OUTPATIENT)
Dept: OBGYN | Facility: CLINIC | Age: 32
End: 2022-05-10
Payer: COMMERCIAL

## 2022-05-10 ENCOUNTER — NON-APPOINTMENT (OUTPATIENT)
Age: 32
End: 2022-05-10

## 2022-05-10 VITALS
SYSTOLIC BLOOD PRESSURE: 117 MMHG | WEIGHT: 160.9 LBS | BODY MASS INDEX: 29.61 KG/M2 | DIASTOLIC BLOOD PRESSURE: 72 MMHG | HEART RATE: 94 BPM | HEIGHT: 62 IN

## 2022-05-10 PROCEDURE — 0502F SUBSEQUENT PRENATAL CARE: CPT

## 2022-06-07 ENCOUNTER — APPOINTMENT (OUTPATIENT)
Dept: OBGYN | Facility: CLINIC | Age: 32
End: 2022-06-07

## 2022-06-07 VITALS
SYSTOLIC BLOOD PRESSURE: 115 MMHG | WEIGHT: 167.9 LBS | BODY MASS INDEX: 30.9 KG/M2 | HEART RATE: 80 BPM | HEIGHT: 62 IN | DIASTOLIC BLOOD PRESSURE: 74 MMHG

## 2022-06-07 PROCEDURE — 0501F PRENATAL FLOW SHEET: CPT

## 2022-06-30 ENCOUNTER — APPOINTMENT (OUTPATIENT)
Dept: ANTEPARTUM | Facility: CLINIC | Age: 32
End: 2022-06-30

## 2022-06-30 ENCOUNTER — ASOB RESULT (OUTPATIENT)
Age: 32
End: 2022-06-30

## 2022-06-30 PROCEDURE — 76816 OB US FOLLOW-UP PER FETUS: CPT

## 2022-07-05 ENCOUNTER — APPOINTMENT (OUTPATIENT)
Dept: OBGYN | Facility: CLINIC | Age: 32
End: 2022-07-05

## 2022-07-05 ENCOUNTER — LABORATORY RESULT (OUTPATIENT)
Age: 32
End: 2022-07-05

## 2022-07-05 VITALS
HEART RATE: 93 BPM | BODY MASS INDEX: 31.28 KG/M2 | DIASTOLIC BLOOD PRESSURE: 73 MMHG | WEIGHT: 170 LBS | SYSTOLIC BLOOD PRESSURE: 119 MMHG | HEIGHT: 62 IN

## 2022-07-05 PROCEDURE — 0502F SUBSEQUENT PRENATAL CARE: CPT

## 2022-07-12 ENCOUNTER — TRANSCRIPTION ENCOUNTER (OUTPATIENT)
Age: 32
End: 2022-07-12

## 2022-07-12 PROBLEM — R73.09 ELEVATED GLUCOSE: Status: ACTIVE | Noted: 2022-07-12

## 2022-07-19 ENCOUNTER — APPOINTMENT (OUTPATIENT)
Dept: OBGYN | Facility: CLINIC | Age: 32
End: 2022-07-19

## 2022-07-25 ENCOUNTER — APPOINTMENT (OUTPATIENT)
Dept: ANTEPARTUM | Facility: CLINIC | Age: 32
End: 2022-07-25

## 2022-07-25 ENCOUNTER — ASOB RESULT (OUTPATIENT)
Age: 32
End: 2022-07-25

## 2022-07-25 PROCEDURE — 76819 FETAL BIOPHYS PROFIL W/O NST: CPT

## 2022-07-25 PROCEDURE — 76816 OB US FOLLOW-UP PER FETUS: CPT

## 2022-07-28 ENCOUNTER — APPOINTMENT (OUTPATIENT)
Dept: OBGYN | Facility: CLINIC | Age: 32
End: 2022-07-28

## 2022-07-28 VITALS
BODY MASS INDEX: 31.1 KG/M2 | SYSTOLIC BLOOD PRESSURE: 109 MMHG | WEIGHT: 169 LBS | HEIGHT: 62 IN | DIASTOLIC BLOOD PRESSURE: 68 MMHG | HEART RATE: 85 BPM

## 2022-07-28 DIAGNOSIS — Z23 ENCOUNTER FOR IMMUNIZATION: ICD-10-CM

## 2022-07-28 PROCEDURE — 0502F SUBSEQUENT PRENATAL CARE: CPT

## 2022-07-28 PROCEDURE — 90715 TDAP VACCINE 7 YRS/> IM: CPT

## 2022-07-28 PROCEDURE — 90471 IMMUNIZATION ADMIN: CPT

## 2022-08-02 ENCOUNTER — APPOINTMENT (OUTPATIENT)
Dept: OBGYN | Facility: CLINIC | Age: 32
End: 2022-08-02

## 2022-08-02 DIAGNOSIS — R73.09 OTHER ABNORMAL GLUCOSE: ICD-10-CM

## 2022-08-03 ENCOUNTER — APPOINTMENT (OUTPATIENT)
Dept: OBGYN | Facility: CLINIC | Age: 32
End: 2022-08-03

## 2022-08-16 ENCOUNTER — APPOINTMENT (OUTPATIENT)
Dept: OBGYN | Facility: CLINIC | Age: 32
End: 2022-08-16

## 2022-08-22 ENCOUNTER — APPOINTMENT (OUTPATIENT)
Dept: ANTEPARTUM | Facility: CLINIC | Age: 32
End: 2022-08-22

## 2022-08-22 ENCOUNTER — NON-APPOINTMENT (OUTPATIENT)
Age: 32
End: 2022-08-22

## 2022-08-22 ENCOUNTER — ASOB RESULT (OUTPATIENT)
Age: 32
End: 2022-08-22

## 2022-08-22 ENCOUNTER — APPOINTMENT (OUTPATIENT)
Dept: OBGYN | Facility: CLINIC | Age: 32
End: 2022-08-22

## 2022-08-22 VITALS
HEIGHT: 62 IN | SYSTOLIC BLOOD PRESSURE: 103 MMHG | BODY MASS INDEX: 31.83 KG/M2 | DIASTOLIC BLOOD PRESSURE: 68 MMHG | HEART RATE: 81 BPM | WEIGHT: 173 LBS

## 2022-08-22 VITALS — HEIGHT: 62 IN

## 2022-08-22 LAB
BASOPHILS # BLD AUTO: 0.02 K/UL
BASOPHILS NFR BLD AUTO: 0.3 %
BLD GP AB SCN SERPL QL: NORMAL
EOSINOPHIL # BLD AUTO: 0.08 K/UL
EOSINOPHIL NFR BLD AUTO: 1 %
GLUCOSE 1H P 50 G GLC PO SERPL-MCNC: 157 MG/DL
HCT VFR BLD CALC: 36.4 %
HGB BLD-MCNC: 11.7 G/DL
IMM GRANULOCYTES NFR BLD AUTO: 0.3 %
LYMPHOCYTES # BLD AUTO: 1.73 K/UL
LYMPHOCYTES NFR BLD AUTO: 22 %
MAN DIFF?: NORMAL
MCHC RBC-ENTMCNC: 32.1 GM/DL
MCHC RBC-ENTMCNC: 32.1 PG
MCV RBC AUTO: 99.7 FL
MONOCYTES # BLD AUTO: 0.38 K/UL
MONOCYTES NFR BLD AUTO: 4.8 %
NEUTROPHILS # BLD AUTO: 5.64 K/UL
NEUTROPHILS NFR BLD AUTO: 71.6 %
PLATELET # BLD AUTO: 256 K/UL
RBC # BLD: 3.65 M/UL
RBC # FLD: 13.6 %
T PALLIDUM AB SER QL IA: NEGATIVE
WBC # FLD AUTO: 7.87 K/UL

## 2022-08-22 PROCEDURE — 0502F SUBSEQUENT PRENATAL CARE: CPT

## 2022-08-22 PROCEDURE — 76819 FETAL BIOPHYS PROFIL W/O NST: CPT

## 2022-08-22 PROCEDURE — 76816 OB US FOLLOW-UP PER FETUS: CPT

## 2022-09-01 ENCOUNTER — APPOINTMENT (OUTPATIENT)
Dept: OBGYN | Facility: CLINIC | Age: 32
End: 2022-09-01

## 2022-09-01 VITALS
SYSTOLIC BLOOD PRESSURE: 110 MMHG | WEIGHT: 176 LBS | BODY MASS INDEX: 32.39 KG/M2 | HEIGHT: 62 IN | HEART RATE: 93 BPM | DIASTOLIC BLOOD PRESSURE: 73 MMHG

## 2022-09-01 PROCEDURE — 0502F SUBSEQUENT PRENATAL CARE: CPT

## 2022-09-02 LAB — HIV1+2 AB SPEC QL IA.RAPID: NONREACTIVE

## 2022-09-06 ENCOUNTER — NON-APPOINTMENT (OUTPATIENT)
Age: 32
End: 2022-09-06

## 2022-09-06 ENCOUNTER — APPOINTMENT (OUTPATIENT)
Dept: OBGYN | Facility: CLINIC | Age: 32
End: 2022-09-06

## 2022-09-06 VITALS
HEIGHT: 62 IN | SYSTOLIC BLOOD PRESSURE: 115 MMHG | HEART RATE: 90 BPM | BODY MASS INDEX: 32.94 KG/M2 | WEIGHT: 179 LBS | DIASTOLIC BLOOD PRESSURE: 74 MMHG

## 2022-09-07 LAB — B-HEM STREP SPEC QL CULT: NORMAL

## 2022-09-13 ENCOUNTER — NON-APPOINTMENT (OUTPATIENT)
Age: 32
End: 2022-09-13

## 2022-09-13 ENCOUNTER — APPOINTMENT (OUTPATIENT)
Dept: OBGYN | Facility: CLINIC | Age: 32
End: 2022-09-13

## 2022-09-13 VITALS
WEIGHT: 177 LBS | DIASTOLIC BLOOD PRESSURE: 79 MMHG | HEART RATE: 89 BPM | SYSTOLIC BLOOD PRESSURE: 123 MMHG | BODY MASS INDEX: 32.57 KG/M2 | HEIGHT: 62 IN

## 2022-09-19 ENCOUNTER — ASOB RESULT (OUTPATIENT)
Age: 32
End: 2022-09-19

## 2022-09-19 ENCOUNTER — APPOINTMENT (OUTPATIENT)
Dept: ANTEPARTUM | Facility: CLINIC | Age: 32
End: 2022-09-19

## 2022-09-19 PROCEDURE — 76816 OB US FOLLOW-UP PER FETUS: CPT

## 2022-09-19 PROCEDURE — 76821 MIDDLE CEREBRAL ARTERY ECHO: CPT

## 2022-09-19 PROCEDURE — 76819 FETAL BIOPHYS PROFIL W/O NST: CPT | Mod: 59

## 2022-09-22 ENCOUNTER — APPOINTMENT (OUTPATIENT)
Dept: OBGYN | Facility: CLINIC | Age: 32
End: 2022-09-22

## 2022-09-22 VITALS
WEIGHT: 180 LBS | HEART RATE: 97 BPM | SYSTOLIC BLOOD PRESSURE: 111 MMHG | HEIGHT: 62 IN | BODY MASS INDEX: 33.13 KG/M2 | DIASTOLIC BLOOD PRESSURE: 76 MMHG

## 2022-09-22 PROCEDURE — 0502F SUBSEQUENT PRENATAL CARE: CPT

## 2022-09-26 ENCOUNTER — ASOB RESULT (OUTPATIENT)
Age: 32
End: 2022-09-26

## 2022-09-26 ENCOUNTER — APPOINTMENT (OUTPATIENT)
Dept: ANTEPARTUM | Facility: CLINIC | Age: 32
End: 2022-09-26

## 2022-09-26 PROCEDURE — 76818 FETAL BIOPHYS PROFILE W/NST: CPT

## 2022-09-27 ENCOUNTER — NON-APPOINTMENT (OUTPATIENT)
Age: 32
End: 2022-09-27

## 2022-09-28 ENCOUNTER — APPOINTMENT (OUTPATIENT)
Dept: OBGYN | Facility: CLINIC | Age: 32
End: 2022-09-28

## 2022-09-28 ENCOUNTER — LABORATORY RESULT (OUTPATIENT)
Age: 32
End: 2022-09-28

## 2022-09-28 VITALS
BODY MASS INDEX: 32.76 KG/M2 | HEIGHT: 62 IN | DIASTOLIC BLOOD PRESSURE: 83 MMHG | SYSTOLIC BLOOD PRESSURE: 119 MMHG | WEIGHT: 178 LBS | HEART RATE: 96 BPM

## 2022-09-29 ENCOUNTER — ASOB RESULT (OUTPATIENT)
Age: 32
End: 2022-09-29

## 2022-09-29 ENCOUNTER — APPOINTMENT (OUTPATIENT)
Dept: ANTEPARTUM | Facility: CLINIC | Age: 32
End: 2022-09-29

## 2022-09-29 PROCEDURE — 76818 FETAL BIOPHYS PROFILE W/NST: CPT

## 2022-09-30 ENCOUNTER — APPOINTMENT (OUTPATIENT)
Dept: OBGYN | Facility: CLINIC | Age: 32
End: 2022-09-30

## 2022-09-30 ENCOUNTER — TRANSCRIPTION ENCOUNTER (OUTPATIENT)
Age: 32
End: 2022-09-30

## 2022-09-30 PROCEDURE — XXXXX: CPT

## 2022-10-02 ENCOUNTER — INPATIENT (INPATIENT)
Facility: HOSPITAL | Age: 32
LOS: 1 days | Discharge: HOME | End: 2022-10-04
Attending: OBSTETRICS & GYNECOLOGY | Admitting: OBSTETRICS & GYNECOLOGY

## 2022-10-02 VITALS — TEMPERATURE: 99 F

## 2022-10-02 DIAGNOSIS — Z98.890 OTHER SPECIFIED POSTPROCEDURAL STATES: Chronic | ICD-10-CM

## 2022-10-02 LAB
BASOPHILS # BLD AUTO: 0.02 K/UL — SIGNIFICANT CHANGE UP (ref 0–0.2)
BASOPHILS NFR BLD AUTO: 0.3 % — SIGNIFICANT CHANGE UP (ref 0–2)
BLD GP AB SCN SERPL QL: NEGATIVE — SIGNIFICANT CHANGE UP
EOSINOPHIL # BLD AUTO: 0.06 K/UL — SIGNIFICANT CHANGE UP (ref 0–0.5)
EOSINOPHIL NFR BLD AUTO: 0.8 % — SIGNIFICANT CHANGE UP (ref 0–6)
HCT VFR BLD CALC: 36.8 % — SIGNIFICANT CHANGE UP (ref 34.5–45)
HGB BLD-MCNC: 12.5 G/DL — SIGNIFICANT CHANGE UP (ref 11.5–15.5)
IANC: 5.64 K/UL — SIGNIFICANT CHANGE UP (ref 1.8–7.4)
IMM GRANULOCYTES NFR BLD AUTO: 1.1 % — HIGH (ref 0–0.9)
LYMPHOCYTES # BLD AUTO: 1.63 K/UL — SIGNIFICANT CHANGE UP (ref 1–3.3)
LYMPHOCYTES # BLD AUTO: 20.5 % — SIGNIFICANT CHANGE UP (ref 13–44)
MCHC RBC-ENTMCNC: 31.8 PG — SIGNIFICANT CHANGE UP (ref 27–34)
MCHC RBC-ENTMCNC: 34 GM/DL — SIGNIFICANT CHANGE UP (ref 32–36)
MCV RBC AUTO: 93.6 FL — SIGNIFICANT CHANGE UP (ref 80–100)
MONOCYTES # BLD AUTO: 0.5 K/UL — SIGNIFICANT CHANGE UP (ref 0–0.9)
MONOCYTES NFR BLD AUTO: 6.3 % — SIGNIFICANT CHANGE UP (ref 2–14)
NEUTROPHILS # BLD AUTO: 5.64 K/UL — SIGNIFICANT CHANGE UP (ref 1.8–7.4)
NEUTROPHILS NFR BLD AUTO: 71 % — SIGNIFICANT CHANGE UP (ref 43–77)
NRBC # BLD: 0 /100 WBCS — SIGNIFICANT CHANGE UP (ref 0–0)
NRBC # FLD: 0 K/UL — SIGNIFICANT CHANGE UP (ref 0–0)
PLATELET # BLD AUTO: 269 K/UL — SIGNIFICANT CHANGE UP (ref 150–400)
RBC # BLD: 3.93 M/UL — SIGNIFICANT CHANGE UP (ref 3.8–5.2)
RBC # FLD: 13 % — SIGNIFICANT CHANGE UP (ref 10.3–14.5)
RH IG SCN BLD-IMP: POSITIVE — SIGNIFICANT CHANGE UP
RH IG SCN BLD-IMP: POSITIVE — SIGNIFICANT CHANGE UP
WBC # BLD: 7.94 K/UL — SIGNIFICANT CHANGE UP (ref 3.8–10.5)
WBC # FLD AUTO: 7.94 K/UL — SIGNIFICANT CHANGE UP (ref 3.8–10.5)

## 2022-10-02 RX ORDER — AMOXICILLIN 250 MG/5ML
500 SUSPENSION, RECONSTITUTED, ORAL (ML) ORAL EVERY 8 HOURS
Refills: 0 | Status: DISCONTINUED | OUTPATIENT
Start: 2022-10-02 | End: 2022-10-02

## 2022-10-02 RX ORDER — CITRIC ACID/SODIUM CITRATE 300-500 MG
15 SOLUTION, ORAL ORAL EVERY 6 HOURS
Refills: 0 | Status: DISCONTINUED | OUTPATIENT
Start: 2022-10-02 | End: 2022-10-03

## 2022-10-02 RX ORDER — SODIUM CHLORIDE 9 MG/ML
1000 INJECTION, SOLUTION INTRAVENOUS
Refills: 0 | Status: DISCONTINUED | OUTPATIENT
Start: 2022-10-02 | End: 2022-10-03

## 2022-10-02 RX ORDER — AMOXICILLIN 250 MG/5ML
500 SUSPENSION, RECONSTITUTED, ORAL (ML) ORAL EVERY 8 HOURS
Refills: 0 | Status: COMPLETED | OUTPATIENT
Start: 2022-10-02 | End: 2022-10-04

## 2022-10-02 RX ORDER — OXYTOCIN 10 UNIT/ML
333.33 VIAL (ML) INJECTION
Qty: 20 | Refills: 0 | Status: DISCONTINUED | OUTPATIENT
Start: 2022-10-02 | End: 2022-10-03

## 2022-10-02 RX ORDER — CHLORHEXIDINE GLUCONATE 213 G/1000ML
1 SOLUTION TOPICAL ONCE
Refills: 0 | Status: COMPLETED | OUTPATIENT
Start: 2022-10-02 | End: 2022-10-03

## 2022-10-02 RX ORDER — INFLUENZA VIRUS VACCINE 15; 15; 15; 15 UG/.5ML; UG/.5ML; UG/.5ML; UG/.5ML
0.5 SUSPENSION INTRAMUSCULAR ONCE
Refills: 0 | Status: DISCONTINUED | OUTPATIENT
Start: 2022-10-02 | End: 2022-10-03

## 2022-10-02 RX ORDER — OXYTOCIN 10 UNIT/ML
2 VIAL (ML) INJECTION
Qty: 30 | Refills: 0 | Status: DISCONTINUED | OUTPATIENT
Start: 2022-10-02 | End: 2022-10-03

## 2022-10-02 RX ADMIN — Medication 500 MILLIGRAM(S): at 21:22

## 2022-10-02 RX ADMIN — Medication 2 MILLIUNIT(S)/MIN: at 21:26

## 2022-10-02 RX ADMIN — Medication 2 MILLIUNIT(S)/MIN: at 18:09

## 2022-10-02 RX ADMIN — SODIUM CHLORIDE 125 MILLILITER(S): 9 INJECTION, SOLUTION INTRAVENOUS at 21:26

## 2022-10-02 NOTE — OB RN PATIENT PROFILE - PROVIDER NOTIFICATION
Presents for routine  appointment.     No complaints.    No abnormal discharge , no leaking fluid , no contractions , no vaginal bleeding    ROS:   and GI  negative.     Please see Prenatal Vitals and Notes Flowsheet for objective data.  Lab Results   Component Value Date    ABO B 2021    RH Pos 2021       A/P:  25 year old  at 25w0d       ICD-10-CM    1. Previous  delivery, antepartum condition or complication  O34.219    2. History of pre-eclampsia in prior pregnancy, currently pregnant  O09.299 Treponema Abs w Reflex to RPR and Titer   3. Chronic hypertension affecting pregnancy  O10.919 OB hemoglobin     Glucose tolerance gest screen 1 hour       1 hr glucola today.  She does have access to MyChart.  She is Rh Positive   CHTN - no meds.  Lose dose aspirin for preeclampsia risk reduction   Anxiety=stable   Follow up anatomy scan normal.   TDAP  next visit.    Discussed signs and symptoms of  labor  Schedule repeat CS next visit.  Her [birth year] the , so we will look at the .  She is leaning against TOLAC  Follow up in 4  weeks.      Meghann Swanson MD     
Declines

## 2022-10-02 NOTE — OB PROVIDER H&P - NSHPPHYSICALEXAM_GEN_ALL_CORE
Objective    – Vital Signs  T(C): 37 (02 Oct 2022 17:00), Max: 37.0 (02 Oct 2022 16:39)  T(F): 98.6 (02 Oct 2022 17:00), Max: 98.6 (02 Oct 2022 16:39)  HR: 77 (02 Oct 2022 17:00) (77 - 77)  BP: 124/83 (02 Oct 2022 17:00) (124/83 - 124/83)  RR: 16 (02 Oct 2022 17:00) (16 - 16)    – Physical exam  CV: extremities well perfused  Pulm: normal respiratory effort on room air  Abd: gravid, nontender  Extr: moving all extremities with ease    – VE: 3/60/-3    – FHT: baseline 1, mod variability, +accels, -decels  – [place]: qmin  – EFW: _g by sono  – Sono: vertex Objective    – Vital Signs  T(C): 37 (02 Oct 2022 17:00), Max: 37.0 (02 Oct 2022 16:39)  T(F): 98.6 (02 Oct 2022 17:00), Max: 98.6 (02 Oct 2022 16:39)  HR: 77 (02 Oct 2022 17:00) (77 - 77)  BP: 124/83 (02 Oct 2022 17:00) (124/83 - 124/83)  RR: 16 (02 Oct 2022 17:00) (16 - 16)    – Physical exam  CV: extremities well perfused  Pulm: normal respiratory effort on room air  Abd: gravid, nontender  Extr: moving all extremities with ease    – VE: 3/60/-3    – FHT: baseline 175, mod variability, +accels, -decels  – [place]: irritable  – EFW: 4029g by sono  – Sono: vertex

## 2022-10-02 NOTE — OB PROVIDER H&P - HISTORY OF PRESENT ILLNESS
Admission H&P    Subjective  HPI: 31yoF  @40w6d gestational age presents for risk reducing IOL. +FM. -LOF. -CTXs. -VB. Pt denies any other concerns.    – PNC: denies prenatal issues. GBS negative.  EFW 4029g by sono (extrapolation from 8#0 on sono 2wks ago)    – OB Hx:  G1 (2011): FT  7#1, uncomplicated  G2 (2019): FT  8#12, uncomplicated  G3 (): ruptured ectopic with L salpingectomy, did not require blood transfusion  – GYN Hx:  reports 1 abnormal pap smear with normal colposcopy , denies fibroids, polyps, ovarian cysts, PCOS, Endometriosis, STIs    – PMH: denies  – Meds: PNV, Amoxicillin day 6 of 7 for otitis media  – Allergies: NKDA  – PSHx: L salpingectomy , R wrist surgery  – Social: denies   – Will accept blood transfusions? Yes Admission H&P    Subjective  HPI: 31yoF  @40w6d gestational age presents for risk reducing IOL. +FM. -LOF. -CTXs. -VB. Pt denies any other concerns.    – PNC: denies prenatal issues. GBS negative.  EFW 4029g by sono (extrapolation from 8#0 on sono 2wks ago)    – OB Hx:  G1 (2011): FT  7#1, uncomplicated  G2 (2019): FT  8#12, uncomplicated  G3 (): ruptured ectopic with L salpingectomy, did not require blood transfusion  – GYN Hx:  reports 1 abnormal pap smear with normal colposcopy , denies fibroids, polyps, ovarian cysts, PCOS, Endometriosis, STIs    – PMH: denies  – Meds: PNV, Amoxicillin 875mg BID day 6 of 7 for otitis media  – Allergies: NKDA  – PSHx: L salpingectomy , R wrist surgery  – Social: denies   – Will accept blood transfusions? Yes

## 2022-10-02 NOTE — OB RN PATIENT PROFILE - FALL HARM RISK - UNIVERSAL INTERVENTIONS
Bed in lowest position, wheels locked, appropriate side rails in place/Call bell, personal items and telephone in reach/Instruct patient to call for assistance before getting out of bed or chair/Non-slip footwear when patient is out of bed/[place] to call system/Physically safe environment - no spills, clutter or unnecessary equipment/Purposeful Proactive Rounding/Room/bathroom lighting operational, light cord in reach

## 2022-10-02 NOTE — OB RN PATIENT PROFILE - NS_OBGYNHISTORY_OBGYN_ALL_OB_FT
11 FT female 7'1   19 FT female 8'12 (IOL for post dates)   ectopic L Salpingectomy   Abnormal pap () colposcopy

## 2022-10-02 NOTE — OB PROVIDER H&P - ASSESSMENT
Assessment  32y/o  @40w6d admitted for rrIOL.  – No prenatal issues. GBS negative.    Plan  1. Admit to LND. Routine Labs. IVF.  2. IOL w/ Pitocin after epidural  3. Fetus: cat 1 tracing. VTX. EFW 4026g by sono. Continuous EFM. Sono. No concerns.  4. Prenatal issues: none  5. GBS negative  6. Pain: epidural then Pitocin  7. Amoxicillin 875mg x3 doses to finish 7 day course twice daily for otitis media    Plan per attending, Dr. Padilla, who also evaluated pt at bedside and confirmed VE.  Foreign Pugh PGY1   Assessment  30y/o  @40w6d admitted for rrIOL.  – No prenatal issues. GBS negative.    Plan  1. Admit to LND. Routine Labs. IVF.  2. IOL w/ Pitocin after epidural  3. Fetus: cat 1 tracing. VTX. EFW 4026g by sono. Continuous EFM. Sono. No concerns.  4. Prenatal issues: none  5. GBS negative  6. Pain: epidural then Pitocin  7. Amoxicillin 500mg q8hr x4 doses to finish 7 day course for otitis media. Pharmacy does not carry 875mg tablet. Per pharmacy, this will provide full coverage.    Plan per attending, Dr. Padilla, who also evaluated pt at bedside and confirmed VE.  Foreign Pugh PGY1

## 2022-10-03 ENCOUNTER — APPOINTMENT (OUTPATIENT)
Dept: ANTEPARTUM | Facility: CLINIC | Age: 32
End: 2022-10-03

## 2022-10-03 LAB
COVID-19 SPIKE DOMAIN AB INTERP: POSITIVE
COVID-19 SPIKE DOMAIN ANTIBODY RESULT: >250 U/ML — HIGH
SARS-COV-2 IGG+IGM SERPL QL IA: >250 U/ML — HIGH
SARS-COV-2 IGG+IGM SERPL QL IA: POSITIVE
T PALLIDUM AB TITR SER: NEGATIVE — SIGNIFICANT CHANGE UP

## 2022-10-03 PROCEDURE — 59400 OBSTETRICAL CARE: CPT | Mod: U9,UB,GC

## 2022-10-03 RX ORDER — AER TRAVELER 0.5 G/1
1 SOLUTION RECTAL; TOPICAL EVERY 4 HOURS
Refills: 0 | Status: DISCONTINUED | OUTPATIENT
Start: 2022-10-03 | End: 2022-10-04

## 2022-10-03 RX ORDER — SIMETHICONE 80 MG/1
80 TABLET, CHEWABLE ORAL EVERY 4 HOURS
Refills: 0 | Status: DISCONTINUED | OUTPATIENT
Start: 2022-10-03 | End: 2022-10-04

## 2022-10-03 RX ORDER — IBUPROFEN 200 MG
600 TABLET ORAL EVERY 6 HOURS
Refills: 0 | Status: DISCONTINUED | OUTPATIENT
Start: 2022-10-03 | End: 2022-10-04

## 2022-10-03 RX ORDER — HYDROCORTISONE 1 %
1 OINTMENT (GRAM) TOPICAL EVERY 6 HOURS
Refills: 0 | Status: DISCONTINUED | OUTPATIENT
Start: 2022-10-03 | End: 2022-10-04

## 2022-10-03 RX ORDER — LANOLIN
1 OINTMENT (GRAM) TOPICAL EVERY 6 HOURS
Refills: 0 | Status: DISCONTINUED | OUTPATIENT
Start: 2022-10-03 | End: 2022-10-04

## 2022-10-03 RX ORDER — OXYCODONE HYDROCHLORIDE 5 MG/1
5 TABLET ORAL ONCE
Refills: 0 | Status: DISCONTINUED | OUTPATIENT
Start: 2022-10-03 | End: 2022-10-04

## 2022-10-03 RX ORDER — SODIUM CHLORIDE 9 MG/ML
3 INJECTION INTRAMUSCULAR; INTRAVENOUS; SUBCUTANEOUS EVERY 8 HOURS
Refills: 0 | Status: DISCONTINUED | OUTPATIENT
Start: 2022-10-03 | End: 2022-10-04

## 2022-10-03 RX ORDER — MAGNESIUM HYDROXIDE 400 MG/1
30 TABLET, CHEWABLE ORAL
Refills: 0 | Status: DISCONTINUED | OUTPATIENT
Start: 2022-10-03 | End: 2022-10-04

## 2022-10-03 RX ORDER — PRAMOXINE HYDROCHLORIDE 150 MG/15G
1 AEROSOL, FOAM RECTAL EVERY 4 HOURS
Refills: 0 | Status: DISCONTINUED | OUTPATIENT
Start: 2022-10-03 | End: 2022-10-04

## 2022-10-03 RX ORDER — DIBUCAINE 1 %
1 OINTMENT (GRAM) RECTAL EVERY 6 HOURS
Refills: 0 | Status: DISCONTINUED | OUTPATIENT
Start: 2022-10-03 | End: 2022-10-04

## 2022-10-03 RX ORDER — ACETAMINOPHEN 500 MG
975 TABLET ORAL
Refills: 0 | Status: DISCONTINUED | OUTPATIENT
Start: 2022-10-03 | End: 2022-10-04

## 2022-10-03 RX ORDER — BENZOCAINE 10 %
1 GEL (GRAM) MUCOUS MEMBRANE EVERY 6 HOURS
Refills: 0 | Status: DISCONTINUED | OUTPATIENT
Start: 2022-10-03 | End: 2022-10-04

## 2022-10-03 RX ORDER — ERTAPENEM SODIUM 1 G/1
1000 INJECTION, POWDER, LYOPHILIZED, FOR SOLUTION INTRAMUSCULAR; INTRAVENOUS ONCE
Refills: 0 | Status: COMPLETED | OUTPATIENT
Start: 2022-10-03 | End: 2022-10-03

## 2022-10-03 RX ORDER — DIPHENHYDRAMINE HCL 50 MG
25 CAPSULE ORAL EVERY 6 HOURS
Refills: 0 | Status: DISCONTINUED | OUTPATIENT
Start: 2022-10-03 | End: 2022-10-04

## 2022-10-03 RX ORDER — TETANUS TOXOID, REDUCED DIPHTHERIA TOXOID AND ACELLULAR PERTUSSIS VACCINE, ADSORBED 5; 2.5; 8; 8; 2.5 [IU]/.5ML; [IU]/.5ML; UG/.5ML; UG/.5ML; UG/.5ML
0.5 SUSPENSION INTRAMUSCULAR ONCE
Refills: 0 | Status: DISCONTINUED | OUTPATIENT
Start: 2022-10-03 | End: 2022-10-04

## 2022-10-03 RX ORDER — OXYTOCIN 10 UNIT/ML
333.33 VIAL (ML) INJECTION
Qty: 20 | Refills: 0 | Status: DISCONTINUED | OUTPATIENT
Start: 2022-10-03 | End: 2022-10-04

## 2022-10-03 RX ORDER — KETOROLAC TROMETHAMINE 30 MG/ML
30 SYRINGE (ML) INJECTION ONCE
Refills: 0 | Status: DISCONTINUED | OUTPATIENT
Start: 2022-10-03 | End: 2022-10-04

## 2022-10-03 RX ORDER — IBUPROFEN 200 MG
600 TABLET ORAL EVERY 6 HOURS
Refills: 0 | Status: COMPLETED | OUTPATIENT
Start: 2022-10-03 | End: 2023-09-01

## 2022-10-03 RX ORDER — OXYCODONE HYDROCHLORIDE 5 MG/1
5 TABLET ORAL
Refills: 0 | Status: DISCONTINUED | OUTPATIENT
Start: 2022-10-03 | End: 2022-10-04

## 2022-10-03 RX ADMIN — Medication 975 MILLIGRAM(S): at 15:55

## 2022-10-03 RX ADMIN — Medication 500 MILLIGRAM(S): at 08:35

## 2022-10-03 RX ADMIN — ERTAPENEM SODIUM 120 MILLIGRAM(S): 1 INJECTION, POWDER, LYOPHILIZED, FOR SOLUTION INTRAMUSCULAR; INTRAVENOUS at 04:50

## 2022-10-03 RX ADMIN — Medication 600 MILLIGRAM(S): at 18:15

## 2022-10-03 RX ADMIN — CHLORHEXIDINE GLUCONATE 1 APPLICATION(S): 213 SOLUTION TOPICAL at 00:56

## 2022-10-03 RX ADMIN — Medication 975 MILLIGRAM(S): at 08:35

## 2022-10-03 RX ADMIN — Medication 975 MILLIGRAM(S): at 20:25

## 2022-10-03 RX ADMIN — SODIUM CHLORIDE 3 MILLILITER(S): 9 INJECTION INTRAMUSCULAR; INTRAVENOUS; SUBCUTANEOUS at 14:08

## 2022-10-03 RX ADMIN — Medication 600 MILLIGRAM(S): at 12:10

## 2022-10-03 RX ADMIN — Medication 975 MILLIGRAM(S): at 09:05

## 2022-10-03 RX ADMIN — Medication 500 MILLIGRAM(S): at 16:00

## 2022-10-03 RX ADMIN — Medication 975 MILLIGRAM(S): at 16:25

## 2022-10-03 RX ADMIN — SODIUM CHLORIDE 3 MILLILITER(S): 9 INJECTION INTRAMUSCULAR; INTRAVENOUS; SUBCUTANEOUS at 22:00

## 2022-10-03 RX ADMIN — Medication 600 MILLIGRAM(S): at 18:45

## 2022-10-03 RX ADMIN — Medication 975 MILLIGRAM(S): at 20:55

## 2022-10-03 RX ADMIN — Medication 600 MILLIGRAM(S): at 12:40

## 2022-10-03 RX ADMIN — Medication 1 TABLET(S): at 12:10

## 2022-10-03 NOTE — OB NEONATOLOGY/PEDIATRICIAN DELIVERY SUMMARY - NSPEDSNEONOTESA_OBGYN_ALL_OB_FT
Called by OBGYN to attend [facility] delivery due to shoulder dystocia. Baby is product of a 41 week gestation born to a  32 y.o. F. Maternal labs include Blood Type A+, HIV-, RPR-, Hep B-, GBS-. Maternal history is significant for ear infection on Amoxicillin. Pregnancy was uncomplicated. AROM on 10/2 at 18:39 with clear fluid. Delivery complicated by Left shoulder dystocia, terminal meconium and nuchal x1. Baby emerged blue. Infant was brought to radiant warmer and warmed, dried, stimulated and suctioned. HR>100, normal respiratory effort with APGARS of 7/9. Delayed cord clamping not performed. Resuscitation included deep suctioning and CPAP for 9 minutes. Highest maternal temperature 37.1 C. EOS score: 0.18. Admit to NBN. Temperature prior to transfer: 36.5 C.    Mom plans to initiate breastfeeding, consents Hep B vaccine.  CODE 100 called by OBGYN to attend  delivery due to shoulder dystocia. Baby is product of a 41 week gestation born to a 31 y/o , A+, HIV neg, RPR neg, Hep B neg, GBS neg (), and Covid neg (10/1) mother via . Maternal history is significant for ear infection and currently on Amoxicillin and prior wrist surgery. OB history of   and  with ectopic pregnancy and salpingectomy . Pregnancy was uncomplicated. AROM on 10/2 at 18:39 with clear fluid ~ 9 hours PTD. Delivery complicated by Left shoulder dystocia, terminal meconium and nuchal x1. Baby emerged immediately before peds arrival with poor color throughout, mildly decreased tone, and significant facial bruising. Infant was brought to radiant warmer and warmed, dried, stimulated and suctioned by RN initially and continued on Peds arrival. HR>100. Delayed cord clamping not performed.  Bulb and deep suctioning performed. CPAP 5 , 21% started at ~ 2 MOL for color and oxygen saturations with improvement noted. CPAP 5, 21% continued intermittently x ~ 9 minutes for oxygen saturations color and intermittent increased work of breathing. Trial off CPAP to Room Air tolerated at ~ 11 MOL with stable respiratory effort and pulse ox readings appropriate for MOL. Highest maternal temperature 37.1 C. EOS score: 0.18. APGARS of 7/9. Admit to NBN. Temperature prior to transfer to mother for skin to skin 36.5 C.  Mom plans to initiate breastfeeding, consents Hep B vaccine.  Nursery Resident, NICU Fellow OLIVER Padilla, and SAUL Villalta attended delivery.

## 2022-10-03 NOTE — CHART NOTE - NSCHARTNOTEFT_GEN_A_CORE
NP note    Patient seen for cervical exam. Effective epidural in place. No complaints at this time.  VSS  FHR: 135mod micah/+accels/-decels  TOCO: q2-3min    Cont with pitocin  Cont EFM/TOCO  Anticipate   Will reassess KIANA Loja NP  suma Padilla
Patient comfortable  FHT category 1  VE 4/70/-2  cont pitocin induction
Patient comfortable  FHT category 1  VE 9.5/100/-1, feels ROP will reposition patient

## 2022-10-03 NOTE — OB PROVIDER DELIVERY SUMMARY - NSPROVIDERDELIVERYNOTE_OBGYN_ALL_OB_FT
Patient pushed over intact perineum, Vertex delivered and called code ob for shoulder dystocia. Legs were already in [facility] and rotated posterior shoulder and delivered posterior shoulder and then rest of fetus followed. Waited for 30 minutes and placenta did not come and did manual removal of placenta and uterus felt clean afterwards and placenta completely intact

## 2022-10-03 NOTE — OB RN DELIVERY SUMMARY - NS_SEPSISRSKCALC_OBGYN_ALL_OB_FT
EOS calculated successfully. EOS Risk Factor: 0.5/1000 live births ([facility] national incidence); GA=41w;Temp=98.78; ROM=8.867; GBS='Negative'; Antibiotics='No antibiotics or any antibiotics < 2 hrs prior to birth'

## 2022-10-03 NOTE — LACTATION INITIAL EVALUATION - GRAVIDA, OB PROFILE
"   06/06/22 1100   Rec Therapy Time Calculation   Rec Start Time 1100   Rec Stop Time 1130   Rec Total Time (min) 30 min   Subjective   Patient states Pt clearly said "I need to sit down' during therapy   Assessment   Cognitive Concerns concentration   Attendance   Activity Recreational Therapy  (Bean Bag Toss)   Participation Active participation   Therapeutic Recreation   Problem Solving Activity Assistance verbal cues;tactile cues;Minimal Assistance  (Hand over hand assist needed for motor planning of RUE)   Assessment   Assessment Sit to stand was contact guard as was dynamic standing balance/reaching. Standing tolerance was 7 minutes. Hand over hand assist needed for RUE motor planning. Verbal cues needed to count, 1,2,3 and to say "go". Problem solving skills were min. Some improvement noted with repetition. Smiled when successful with task   Plan   Planned Therapy Intervention Continue with current plan  (Bean Bag Toss)   Expected Length of Stay Pending   PT Frequency Minimu of 5 visits per week;Other (see comments)  (Daily)   Time   Treatment time 2 units     " 4

## 2022-10-03 NOTE — LACTATION INITIAL EVALUATION - LACTATION INTERVENTIONS
initiate/review safe skin-to-skin/initiate/review hand expression/initiate/review techniques for position and latch/initiate/review finger suck/initiate/review breast massage/compression/reviewed components of an effective feeding and at least 8 effective feedings per day required/reviewed importance of monitoring infant diapers, the breastfeeding log, and minimum output each day/reviewed risks of unnecessary formula supplementation/reviewed risks of artificial nipples/reviewed strategies to transition to breastfeeding only/reviewed benefits and recommendations for rooming in/reviewed feeding on demand/by cue at least 8 times a day/reviewed indications of inadequate milk transfer that would require supplementation

## 2022-10-03 NOTE — OB RN DELIVERY SUMMARY - NSSELHIDDEN_OBGYN_ALL_OB_FT
[NS_DeliveryAttending1_OBGYN_ALL_OB_FT:[id number]=],[NS_DeliveryAssist1_OBGYN_ALL_OB_FT:[id number]],[NS_DeliveryRN_OBGYN_ALL_OB_FT:[id number]=]

## 2022-10-04 ENCOUNTER — TRANSCRIPTION ENCOUNTER (OUTPATIENT)
Age: 32
End: 2022-10-04

## 2022-10-04 VITALS
DIASTOLIC BLOOD PRESSURE: 73 MMHG | TEMPERATURE: 99 F | RESPIRATION RATE: 18 BRPM | OXYGEN SATURATION: 100 % | SYSTOLIC BLOOD PRESSURE: 126 MMHG | HEART RATE: 69 BPM

## 2022-10-04 RX ORDER — ACETAMINOPHEN 500 MG
3 TABLET ORAL
Qty: 0 | Refills: 0 | DISCHARGE
Start: 2022-10-04

## 2022-10-04 RX ADMIN — Medication 1 TABLET(S): at 12:10

## 2022-10-04 RX ADMIN — Medication 600 MILLIGRAM(S): at 12:40

## 2022-10-04 RX ADMIN — SODIUM CHLORIDE 3 MILLILITER(S): 9 INJECTION INTRAMUSCULAR; INTRAVENOUS; SUBCUTANEOUS at 06:00

## 2022-10-04 RX ADMIN — Medication 600 MILLIGRAM(S): at 06:00

## 2022-10-04 RX ADMIN — Medication 500 MILLIGRAM(S): at 00:03

## 2022-10-04 RX ADMIN — Medication 975 MILLIGRAM(S): at 09:20

## 2022-10-04 RX ADMIN — Medication 975 MILLIGRAM(S): at 03:35

## 2022-10-04 RX ADMIN — Medication 600 MILLIGRAM(S): at 05:55

## 2022-10-04 RX ADMIN — Medication 600 MILLIGRAM(S): at 00:03

## 2022-10-04 RX ADMIN — Medication 600 MILLIGRAM(S): at 12:10

## 2022-10-04 RX ADMIN — Medication 975 MILLIGRAM(S): at 08:50

## 2022-10-04 RX ADMIN — Medication 600 MILLIGRAM(S): at 00:30

## 2022-10-04 RX ADMIN — Medication 975 MILLIGRAM(S): at 04:05

## 2022-10-04 NOTE — DISCHARGE NOTE OB - HOSPITAL COURSE
Patient admitted for induction at 40.6 weeks, Delivered viable female infant at 41 weeks with shoulder dystocia

## 2022-10-04 NOTE — DISCHARGE NOTE OB - PATIENT PORTAL LINK FT
You can access the FollowMyHealth Patient Portal offered by [facility] by registering at the following website: [email or web address]. By joining [facility]’s FollowMyHealth portal, you will also be able to view your health information using other applications (apps) compatible with our system.

## 2022-10-04 NOTE — DISCHARGE NOTE OB - CARE PROVIDER_API CALL
Padmini Wesley (MD)  Obstetrics and Gynecology  [facility] [place] [address]  Phone: [phone]  Fax: [phone]  Follow Up Time:

## 2022-10-04 NOTE — PROGRESS NOTE ADULT - SUBJECTIVE AND OBJECTIVE BOX
[address] [place] [address] Dr JACOB ZAPATA [facility] EMERGENCY DEPT  [id number] [address] [phone]    Work/School Note    Date: 1/22/2022    To Whom It May concern:    Gwen Sauceda was seen and treated today in the emergency room by the following provider(s):  Physician Assistant: MATI Stoll. Gwen Sauceda is excused from work/school on 01/23/22 and 01/24/22. He is medically clear to return to work/school on 1/25/2022.        Sincerely,          MATI Yun S: 32yo PPD#1 s/p  c/b shoulder dystocia.  s/p Invanz for manual removal of placenta.  PSHx: left salpingectomy for ruptured ectopic & wrist surgery.  PMH: otitis media s/p antibiotics.  Patient feels well. Pain is well controlled. She is tolerating a regular diet and passing flatus. She is voiding spontaneously, and ambulating without difficulty. Denies CP/SOB. Denies lightheadedness/dizziness. Denies N/V.    O:  Vitals:  Vital Signs Last 24 Hrs  T(C): 36.7 (04 Oct 2022 06:00), Max: 37 (04 Oct 2022 02:00)  T(F): 98.1 (04 Oct 2022 06:00), Max: 98.6 (04 Oct 2022 02:00)  HR: 72 (04 Oct 2022 06:00) (70 - 90)  BP: 120/65 (04 Oct 2022 06:00) (110/60 - 121/69)  BP(mean): --  RR: 18 (04 Oct 2022 06:00) (16 - 18)  SpO2: 100% (04 Oct 2022 06:00) (100% - 100%)    Parameters below as of 04 Oct 2022 06:00  Patient On (Oxygen Delivery Method): room air        MEDICATIONS  (STANDING):  acetaminophen     Tablet .. 975 milliGRAM(s) Oral <User Schedule>  diphtheria/tetanus/pertussis (acellular) Vaccine (ADAcel) 0.5 milliLiter(s) IntraMuscular once  ibuprofen  Tablet. 600 milliGRAM(s) Oral every 6 hours  ketorolac   Injectable 30 milliGRAM(s) IV Push once  oxytocin Infusion 333.333 milliUNIT(s)/Min (1000 mL/Hr) IV Continuous <Continuous>  prenatal multivitamin 1 Tablet(s) Oral daily  sodium chloride 0.9% lock flush 3 milliLiter(s) IV Push every 8 hours      Labs:  Blood type: A Positive  Rubella IgG: RPR: Negative                          12.5   7.94 >-----------< 269    ( 10-02 @ 17:45 )             36.8        Physical Exam:  General: NAD  Abdomen: soft, non-tender, non-distended, fundus firm  Vaginal: Lochia wnl  Extremities: No calf tenderness    A/P: 32yo PPD#1 s/p .  Patient is stable and doing well post-partum.   - Pain well controlled, continue current pain regimen  - Increase ambulation, SCDs when not ambulating  - Continue regular diet  - Discharge planning     Jacy JOSEPH

## 2022-10-04 NOTE — DISCHARGE NOTE OB - CARE PLAN
1 Principal Discharge DX:	Post term pregnancy, 41 weeks  Assessment and plan of treatment:	pelvic rest x 6 weeks

## 2022-10-04 NOTE — DISCHARGE NOTE OB - MATERIALS PROVIDED
Vaccinations/[facility]  Screening Program/  Immunization Record/Breastfeeding Log/Bottle Feeding Log/Guide to Postpartum Care/[facility] Hearing Screen Program/Shaken Baby Prevention Handout/Breastfeeding Guide and Packet/Discharge Medication Information for Patients and Families Pocket Guide/MMR Vaccination (VIS Pub Date: 2012)/Tdap Vaccination (VIS Pub Date: 2012)

## 2022-10-04 NOTE — DISCHARGE NOTE OB - MEDICATION SUMMARY - MEDICATIONS TO TAKE
I will START or STAY ON the medications listed below when I get home from the hospital:    ibuprofen 600 mg oral tablet  -- 1 tab(s) by mouth every 6 hours   -- Do not take this drug if you are pregnant.  It is very important that you take or use this exactly as directed.  Do not skip doses or discontinue unless directed by your doctor.  May cause drowsiness or dizziness.  Obtain medical advice before taking any non-prescription drugs as some may affect the action of this medication.  Take with food or milk.    -- Indication: For Encounter for full-term uncomplicated delivery    acetaminophen 325 mg oral tablet  -- 3 tab(s) by mouth every 8 hours, As Needed  -- Indication: For Encounter for full-term uncomplicated delivery

## 2022-10-04 NOTE — DISCHARGE NOTE OB - NS MD DC FALL RISK RISK
For information on Fall & Injury Prevention, visit: [email or web address] OR  [email or web address] OR  https://www.cdc.gov/steadi/patient.html

## 2022-10-11 ENCOUNTER — NON-APPOINTMENT (OUTPATIENT)
Age: 32
End: 2022-10-11

## 2022-11-09 LAB
ABO + RH PNL BLD: NORMAL
BLD GP AB SCN SERPL QL: NORMAL
SARS-COV-2 N GENE NPH QL NAA+PROBE: NOT DETECTED

## 2022-11-15 ENCOUNTER — APPOINTMENT (OUTPATIENT)
Dept: OBGYN | Facility: CLINIC | Age: 32
End: 2022-11-15

## 2022-11-15 VITALS
BODY MASS INDEX: 30.36 KG/M2 | DIASTOLIC BLOOD PRESSURE: 78 MMHG | SYSTOLIC BLOOD PRESSURE: 114 MMHG | HEART RATE: 78 BPM | WEIGHT: 165 LBS | HEIGHT: 62 IN

## 2022-11-15 DIAGNOSIS — Z01.812 ENCOUNTER FOR PREPROCEDURAL LABORATORY EXAMINATION: ICD-10-CM

## 2022-11-15 DIAGNOSIS — Z34.82 ENCOUNTER FOR SUPERVISION OF OTHER NORMAL PREGNANCY, SECOND TRIMESTER: ICD-10-CM

## 2022-11-15 DIAGNOSIS — Z34.83 ENCOUNTER FOR SUPERVISION OF OTHER NORMAL PREGNANCY, THIRD TRIMESTER: ICD-10-CM

## 2022-11-15 DIAGNOSIS — Z3A.19 19 WEEKS GESTATION OF PREGNANCY: ICD-10-CM

## 2022-11-15 DIAGNOSIS — Z32.01 ENCOUNTER FOR PREGNANCY TEST, RESULT POSITIVE: ICD-10-CM

## 2022-11-15 DIAGNOSIS — Z34.93 ENCOUNTER FOR SUPERVISION OF NORMAL PREGNANCY, UNSPECIFIED, THIRD TRIMESTER: ICD-10-CM

## 2022-11-15 DIAGNOSIS — Z20.822 ENCOUNTER FOR PREPROCEDURAL LABORATORY EXAMINATION: ICD-10-CM

## 2022-11-15 PROCEDURE — 0503F POSTPARTUM CARE VISIT: CPT

## 2022-11-15 NOTE — HISTORY OF PRESENT ILLNESS
[Postpartum Follow Up] : postpartum follow up [Delivery Date: ___] : on [unfilled] [Girl] : baby is a girl [Infant's Name ___] : [unfilled] [___ Lbs] : [unfilled] lbs [___ Oz] : [unfilled] oz [Living at Home] : is currently living at home [Intended Contraception] : Intended Contraception: [Complications:___] : complications include: [unfilled] [] : delivered by vaginal delivery [Breastfeeding] : currently nursing [Awake] : awake [Alert] : alert [Soft] : soft [Oriented x3] : oriented to person, place, and time [Normal] : external genitalia [Labia Majora] : labia major [Doing Well] : is doing well [No Sign of Infection] : is showing no signs of infection [Excellent Pain Control] : has excellent pain control [None] : None [Resumed Menses] : has not resumed her menses [Resumed [place]] : has not resumed intercourse [Acute Distress] : no acute distress [Mass] : no breast mass [Nipple Discharge] : no nipple discharge [Axillary LAD] : no axillary lymphadenopathy [Tender] : non tender [Distended] : not distended [Depressed Mood] : not depressed [Flat Affect] : affect not flat [Motion Tenderness] : there was no cervical motion tenderness [Tenderness] : nontender [FreeTextEntry8] : This 33 yo  presents for PPV after vaginal delivery; feels well, voiding and stooling without issue, considering her contraception options. [de-identified] : breast milk and formula [de-identified] : Nl PPV [de-identified] : Will task Billing Dept for IUD coverage- literature in hand; RTO prn or for annual in 4 months

## 2023-04-17 NOTE — OB RN DELIVERY SUMMARY - [email or web address]_FT
Corrie LINDA, aRndy LINDA, Bela MIX, Prabhu HUGHES Stelara Pregnancy And Lactation Text: This medication is Pregnancy Category B and is considered safe during pregnancy. It is unknown if this medication is excreted in breast milk. Spironolactone Pregnancy And Lactation Text: This medication can cause feminization of the male fetus and should be avoided during pregnancy. The active metabolite is also found in breast milk. Cimetidine Counseling:  I discussed with the patient the risks of Cimetidine including but not limited to gynecomastia, headache, diarrhea, nausea, drowsiness, arrhythmias, pancreatitis, skin rashes, psychosis, bone marrow suppression and kidney toxicity. Topical Clindamycin Counseling: Patient counseled that this medication may cause skin irritation or allergic reactions.  In the event of skin irritation, the patient was advised to reduce the amount of the drug applied or use it less frequently.   The patient verbalized understanding of the proper use and possible adverse effects of clindamycin.  All of the patient's questions and concerns were addressed. Odomzo Counseling- I discussed with the patient the risks of Odomzo including but not limited to nausea, vomiting, diarrhea, constipation, weight loss, changes in the sense of taste, decreased appetite, muscle spasms, and hair loss.  The patient verbalized understanding of the proper use and possible adverse effects of Odomzo.  All of the patient's questions and concerns were addressed. Hydroquinone Counseling:  Patient advised that medication may result in skin irritation, lightening (hypopigmentation), dryness, and burning.  In the event of skin irritation, the patient was advised to reduce the amount of the drug applied or use it less frequently.  Rarely, spots that are treated with hydroquinone can become darker (pseudoochronosis).  Should this occur, patient instructed to stop medication and call the office. The patient verbalized understanding of the proper use and possible adverse effects of hydroquinone.  All of the patient's questions and concerns were addressed. Topical Sulfur Applications Pregnancy And Lactation Text: This medication is Pregnancy Category C and has an unknown safety profile during pregnancy. It is unknown if this topical medication is excreted in breast milk. 5-Fu Pregnancy And Lactation Text: This medication is Pregnancy Category X and contraindicated in pregnancy and in women who may become pregnant. It is unknown if this medication is excreted in breast milk. Methotrexate Counseling:  Patient counseled regarding adverse effects of methotrexate including but not limited to nausea, vomiting, abnormalities in liver function tests. Patients may develop mouth sores, rash, diarrhea, and abnormalities in blood counts. The patient understands that monitoring is required including LFT's and blood counts.  There is a rare possibility of scarring of the liver and lung problems that can occur when taking methotrexate. Persistent nausea, loss of appetite, pale stools, dark urine, cough, and shortness of breath should be reported immediately. Patient advised to discontinue methotrexate treatment at least three months before attempting to become pregnant.  I discussed the need for folate supplements while taking methotrexate.  These supplements can decrease side effects during methotrexate treatment. The patient verbalized understanding of the proper use and possible adverse effects of methotrexate.  All of the patient's questions and concerns were addressed. Oxybutynin Counseling:  I discussed with the patient the risks of oxybutynin including but not limited to skin rash, drowsiness, dry mouth, difficulty urinating, and blurred vision. Ivermectin Pregnancy And Lactation Text: This medication is Pregnancy Category C and it isn't known if it is safe during pregnancy. It is also excreted in breast milk. Enbrel Counseling:  I discussed with the patient the risks of etanercept including but not limited to myelosuppression, immunosuppression, autoimmune hepatitis, demyelinating diseases, lymphoma, and infections.  The patient understands that monitoring is required including a PPD at baseline and must alert us or the primary physician if symptoms of infection or other concerning signs are noted. Rituxan Pregnancy And Lactation Text: This medication is Pregnancy Category C and it isn't know if it is safe during pregnancy. It is unknown if this medication is excreted in breast milk but similar antibodies are known to be excreted. Colchicine Pregnancy And Lactation Text: This medication is Pregnancy Category C and isn't considered safe during pregnancy. It is excreted in breast milk. Erythromycin Counseling:  I discussed with the patient the risks of erythromycin including but not limited to GI upset, allergic reaction, drug rash, diarrhea, increase in liver enzymes, and yeast infections. Rinvoq Pregnancy And Lactation Text: Based on animal studies, Rinvoq may cause embryo-fetal harm when administered to pregnant women.  The medication should not be used in pregnancy.  Breastfeeding is not recommended during treatment and for 6 days after the last dose. Niacinamide Pregnancy And Lactation Text: These medications are considered safe during pregnancy. Benzoyl Peroxide Counseling: Patient counseled that medicine may cause skin irritation and bleach clothing.  In the event of skin irritation, the patient was advised to reduce the amount of the drug applied or use it less frequently.   The patient verbalized understanding of the proper use and possible adverse effects of benzoyl peroxide.  All of the patient's questions and concerns were addressed. Zyclara Pregnancy And Lactation Text: This medication is Pregnancy Category C. It is unknown if this medication is excreted in breast milk. Rhofade Pregnancy And Lactation Text: This medication has not been assigned a Pregnancy Risk Category. It is unknown if the medication is excreted in breast milk. Quinolones Pregnancy And Lactation Text: This medication is Pregnancy Category C and it isn't know if it is safe during pregnancy. It is also excreted in breast milk. Isotretinoin Counseling: Patient should get monthly blood tests, not donate blood, not drive at night if vision affected, not share medication, and not undergo elective surgery for 6 months after tx completed. Side effects reviewed, pt to contact office should one occur. Wartpeel Counseling:  I discussed with the patient the risks of Wartpeel including but not limited to erythema, scaling, itching, weeping, crusting, and pain. Valtrex Counseling: I discussed with the patient the risks of valacyclovir including but not limited to kidney damage, nausea, vomiting and severe allergy.  The patient understands that if the infection seems to be worsening or is not improving, they are to call. Opzelura Counseling:  I discussed with the patient the risks of Opzelura including but not limited to nasopharngitis, bronchitis, ear infection, eosinophila, hives, diarrhea, folliculitis, tonsillitis, and rhinorrhea.  Taken orally, this medication has been linked to serious infections; higher rate of mortality; malignancy and lymphoproliferative disorders; major adverse cardiovascular events; thrombosis; thrombocytopenia, anemia, and neutropenia; and lipid elevations. Gabapentin Counseling: I discussed with the patient the risks of gabapentin including but not limited to dizziness, somnolence, fatigue and ataxia. Odomzo Pregnancy And Lactation Text: This medication is Pregnancy Category X and is absolutely contraindicated during pregnancy. It is unknown if it is excreted in breast milk. Taltz Counseling: I discussed with the patient the risks of ixekizumab including but not limited to immunosuppression, serious infections, worsening of inflammatory bowel disease and drug reactions.  The patient understands that monitoring is required including a PPD at baseline and must alert us or the primary physician if symptoms of infection or other concerning signs are noted. Azithromycin Pregnancy And Lactation Text: This medication is considered safe during pregnancy and is also secreted in breast milk. Cimetidine Pregnancy And Lactation Text: This medication is Pregnancy Category B and is considered safe during pregnancy. It is also excreted in breast milk and breast feeding isn't recommended. Griseofulvin Counseling:  I discussed with the patient the risks of griseofulvin including but not limited to photosensitivity, cytopenia, liver damage, nausea/vomiting and severe allergy.  The patient understands that this medication is best absorbed when taken with a fatty meal (e.g., ice cream or french fries). Siliq Counseling:  I discussed with the patient the risks of Siliq including but not limited to new or worsening depression, suicidal thoughts and behavior, immunosuppression, malignancy, posterior leukoencephalopathy syndrome, and serious infections.  The patient understands that monitoring is required including a PPD at baseline and must alert us or the primary physician if symptoms of infection or other concerning signs are noted. There is also a special program designed to monitor depression which is required with Siliq. Hydroquinone Pregnancy And Lactation Text: This medication has not been assigned a Pregnancy Risk Category but animal studies failed to show danger with the topical medication. It is unknown if the medication is excreted in breast milk. Dapsone Counseling: I discussed with the patient the risks of dapsone including but not limited to hemolytic anemia, agranulocytosis, rashes, methemoglobinemia, kidney failure, peripheral neuropathy, headaches, GI upset, and liver toxicity.  Patients who start dapsone require monitoring including baseline LFTs and weekly CBCs for the first month, then every month thereafter.  The patient verbalized understanding of the proper use and possible adverse effects of dapsone.  All of the patient's questions and concerns were addressed. Drysol Counseling:  I discussed with the patient the risks of drysol/aluminum chloride including but not limited to skin rash, itching, irritation, burning. Methotrexate Pregnancy And Lactation Text: This medication is Pregnancy Category X and is known to cause fetal harm. This medication is excreted in breast milk. Topical Clindamycin Pregnancy And Lactation Text: This medication is Pregnancy Category B and is considered safe during pregnancy. It is unknown if it is excreted in breast milk. Benzoyl Peroxide Pregnancy And Lactation Text: This medication is Pregnancy Category C. It is unknown if benzoyl peroxide is excreted in breast milk. Solaraze Counseling:  I discussed with the patient the risks of Solaraze including but not limited to erythema, scaling, itching, weeping, crusting, and pain. Sotyktu Counseling:  I discussed the most common side effects of Sotyktu including: common cold, sore throat, sinus infections, cold sores, canker sores, folliculitis, and acne.? I also discussed more serious side effects of Sotyktu including but not limited to: serious allergic reactions; increased risk for infections such as TB; cancers such as lymphomas; rhabdomyolysis and elevated CPK; and elevated triglycerides and liver enzymes.? Erythromycin Pregnancy And Lactation Text: This medication is Pregnancy Category B and is considered safe during pregnancy. It is also excreted in breast milk. Adbry Counseling: I discussed with the patient the risks of tralokinumab including but not limited to eye infection and irritation, cold sores, injection site reactions, worsening of asthma, allergic reactions and increased risk of parasitic infection.  Live vaccines should be avoided while taking tralokinumab. The patient understands that monitoring is required and they must alert us or the primary physician if symptoms of infection or other concerning signs are noted. Nsaids Counseling: NSAID Counseling: I discussed with the patient that NSAIDs should be taken with food. Prolonged use of NSAIDs can result in the development of stomach ulcers.  Patient advised to stop taking NSAIDs if abdominal pain occurs.  The patient verbalized understanding of the proper use and possible adverse effects of NSAIDs.  All of the patient's questions and concerns were addressed. Azathioprine Counseling:  I discussed with the patient the risks of azathioprine including but not limited to myelosuppression, immunosuppression, hepatotoxicity, lymphoma, and infections.  The patient understands that monitoring is required including baseline LFTs, Creatinine, possible TPMP genotyping and weekly CBCs for the first month and then every 2 weeks thereafter.  The patient verbalized understanding of the proper use and possible adverse effects of azathioprine.  All of the patient's questions and concerns were addressed. Isotretinoin Pregnancy And Lactation Text: This medication is Pregnancy Category X and is considered extremely dangerous during pregnancy. It is unknown if it is excreted in breast milk. Rifampin Counseling: I discussed with the patient the risks of rifampin including but not limited to liver damage, kidney damage, red-orange body fluids, nausea/vomiting and severe allergy. Opzelura Pregnancy And Lactation Text: There is insufficient data to evaluate drug-associated risk for major birth defects, miscarriage, or other adverse maternal or fetal outcomes.  There is a pregnancy registry that monitors pregnancy outcomes in pregnant persons exposed to the medication during pregnancy.  It is unknown if this medication is excreted in breast milk.  Do not breastfeed during treatment and for about 4 weeks after the last dose. Valtrex Pregnancy And Lactation Text: this medication is Pregnancy Category B and is considered safe during pregnancy. This medication is not directly found in breast milk but it's metabolite acyclovir is present. Bactrim Counseling:  I discussed with the patient the risks of sulfa antibiotics including but not limited to GI upset, allergic reaction, drug rash, diarrhea, dizziness, photosensitivity, and yeast infections.  Rarely, more serious reactions can occur including but not limited to aplastic anemia, agranulocytosis, methemoglobinemia, blood dyscrasias, liver or kidney failure, lung infiltrates or desquamative/blistering drug rashes. Siliq Pregnancy And Lactation Text: The risk during pregnancy and breastfeeding is uncertain with this medication. Griseofulvin Pregnancy And Lactation Text: This medication is Pregnancy Category X and is known to cause serious birth defects. It is unknown if this medication is excreted in breast milk but breast feeding should be avoided. Opioid Counseling: I discussed with the patient the potential side effects of opioids including but not limited to addiction, altered mental status, and depression. I stressed avoiding alcohol, benzodiazepines, muscle relaxants and sleep aids unless specifically okayed by a physician. The patient verbalized understanding of the proper use and possible adverse effects of opioids. All of the patient's questions and concerns were addressed. They were instructed to flush the remaining pills down the toilet if they did not need them for pain. Doxepin Counseling:  Patient advised that the medication is sedating and not to drive a car after taking this medication. Patient informed of potential adverse effects including but not limited to dry mouth, urinary retention, and blurry vision.  The patient verbalized understanding of the proper use and possible adverse effects of doxepin.  All of the patient's questions and concerns were addressed. Imiquimod Counseling:  I discussed with the patient the risks of imiquimod including but not limited to erythema, scaling, itching, weeping, crusting, and pain.  Patient understands that the inflammatory response to imiquimod is variable from person to person and was educated regarded proper titration schedule.  If flu-like symptoms develop, patient knows to discontinue the medication and contact us. Dutasteride Male Counseling: Dustasteride Counseling:  I discussed with the patient the risks of use of dutasteride including but not limited to decreased libido, decreased ejaculate volume, and gynecomastia. Women who can become pregnant should not handle medication.  All of the patient's questions and concerns were addressed. Prednisone Counseling:  I discussed with the patient the risks of prolonged use of prednisone including but not limited to weight gain, insomnia, osteoporosis, mood changes, diabetes, susceptibility to infection, glaucoma and high blood pressure.  In cases where prednisone use is prolonged, patients should be monitored with blood pressure checks, serum glucose levels and an eye exam.  Additionally, the patient may need to be placed on GI prophylaxis, PCP prophylaxis, and calcium and vitamin D supplementation and/or a bisphosphonate.  The patient verbalized understanding of the proper use and the possible adverse effects of prednisone.  All of the patient's questions and concerns were addressed. Solaraze Pregnancy And Lactation Text: This medication is Pregnancy Category B and is considered safe. There is some data to suggest avoiding during the third trimester. It is unknown if this medication is excreted in breast milk. Propranolol Counseling:  I discussed with the patient the risks of propranolol including but not limited to low heart rate, low blood pressure, low blood sugar, restlessness and increased cold sensitivity. They should call the office if they experience any of these side effects. Topical Ketoconazole Counseling: Patient counseled that this medication may cause skin irritation or allergic reactions.  In the event of skin irritation, the patient was advised to reduce the amount of the drug applied or use it less frequently.   The patient verbalized understanding of the proper use and possible adverse effects of ketoconazole.  All of the patient's questions and concerns were addressed. Dapsone Pregnancy And Lactation Text: This medication is Pregnancy Category C and is not considered safe during pregnancy or breast feeding. Carac Counseling:  I discussed with the patient the risks of Carac including but not limited to erythema, scaling, itching, weeping, crusting, and pain. Nsaids Pregnancy And Lactation Text: These medications are considered safe up to 30 weeks gestation. It is excreted in breast milk. Rifampin Pregnancy And Lactation Text: This medication is Pregnancy Category C and it isn't know if it is safe during pregnancy. It is also excreted in breast milk and should not be used if you are breast feeding. Azathioprine Pregnancy And Lactation Text: This medication is Pregnancy Category D and isn't considered safe during pregnancy. It is unknown if this medication is excreted in breast milk. High Dose Vitamin A Counseling: Side effects reviewed, pt to contact office should one occur. Sotyktu Pregnancy And Lactation Text: There is insufficient data to evaluate whether or not Sotyktu is safe to use during pregnancy.? ?It is not known if Sotyktu passes into breast milk and whether or not it is safe to use when breastfeeding.?? Metronidazole Counseling:  I discussed with the patient the risks of metronidazole including but not limited to seizures, nausea/vomiting, a metallic taste in the mouth, nausea/vomiting and severe allergy. Adbry Pregnancy And Lactation Text: It is unknown if this medication will adversely affect pregnancy or breast feeding. Humira Counseling:  I discussed with the patient the risks of adalimumab including but not limited to myelosuppression, immunosuppression, autoimmune hepatitis, demyelinating diseases, lymphoma, and serious infections.  The patient understands that monitoring is required including a PPD at baseline and must alert us or the primary physician if symptoms of infection or other concerning signs are noted. Bactrim Pregnancy And Lactation Text: This medication is Pregnancy Category D and is known to cause fetal risk.  It is also excreted in breast milk. Doxepin Pregnancy And Lactation Text: This medication is Pregnancy Category C and it isn't known if it is safe during pregnancy. It is also excreted in breast milk and breast feeding isn't recommended. Itraconazole Counseling:  I discussed with the patient the risks of itraconazole including but not limited to liver damage, nausea/vomiting, neuropathy, and severe allergy.  The patient understands that this medication is best absorbed when taken with acidic beverages such as non-diet cola or ginger ale.  The patient understands that monitoring is required including baseline LFTs and repeat LFTs at intervals.  The patient understands that they are to contact us or the primary physician if concerning signs are noted. Glycopyrrolate Counseling:  I discussed with the patient the risks of glycopyrrolate including but not limited to skin rash, drowsiness, dry mouth, difficulty urinating, and blurred vision. Winlevi Counseling:  I discussed with the patient the risks of topical clascoterone including but not limited to erythema, scaling, itching, and stinging. Patient voiced their understanding. Picato Counseling:  I discussed with the patient the risks of Picato including but not limited to erythema, scaling, itching, weeping, crusting, and pain. Tremfya Counseling: I discussed with the patient the risks of guselkumab including but not limited to immunosuppression, serious infections, worsening of inflammatory bowel disease and drug reactions.  The patient understands that monitoring is required including a PPD at baseline and must alert us or the primary physician if symptoms of infection or other concerning signs are noted. Use Enhanced Medication Counseling?: No Propranolol Pregnancy And Lactation Text: This medication is Pregnancy Category C and it isn't known if it is safe during pregnancy. It is excreted in breast milk. Prednisone Pregnancy And Lactation Text: This medication is Pregnancy Category C and it isn't know if it is safe during pregnancy. This medication is excreted in breast milk. Dutasteride Pregnancy And Lactation Text: This medication is absolutely contraindicated in women, especially during pregnancy and breast feeding. Feminization of male fetuses is possible if taking while pregnant. Opioid Pregnancy And Lactation Text: These medications can lead to premature delivery and should be avoided during pregnancy. These medications are also present in breast milk in small amounts. Simponi Counseling:  I discussed with the patient the risks of golimumab including but not limited to myelosuppression, immunosuppression, autoimmune hepatitis, demyelinating diseases, lymphoma, and serious infections.  The patient understands that monitoring is required including a PPD at baseline and must alert us or the primary physician if symptoms of infection or other concerning signs are noted. Xeljanz Counseling: I discussed with the patient the risks of Xeljanz therapy including increased risk of infection, liver issues, headache, diarrhea, or cold symptoms. Live vaccines should be avoided. They were instructed to call if they have any problems. Cellcept Counseling:  I discussed with the patient the risks of mycophenolate mofetil including but not limited to infection/immunosuppression, GI upset, hypokalemia, hypercholesterolemia, bone marrow suppression, lymphoproliferative disorders, malignancy, GI ulceration/bleed/perforation, colitis, interstitial lung disease, kidney failure, progressive multifocal leukoencephalopathy, and birth defects.  The patient understands that monitoring is required including a baseline creatinine and regular CBC testing. In addition, patient must alert us immediately if symptoms of infection or other concerning signs are noted. Soolantra Counseling: I discussed with the patients the risks of topial Soolantra. This is a medicine which decreases the number of mites and inflammation in the skin. You experience burning, stinging, eye irritation or allergic reactions.  Please call our office if you develop any problems from using this medication. Sarecycline Counseling: Patient advised regarding possible photosensitivity and discoloration of the teeth, skin, lips, tongue and gums.  Patient instructed to avoid sunlight, if possible.  When exposed to sunlight, patients should wear protective clothing, sunglasses, and sunscreen.  The patient was instructed to call the office immediately if the following severe adverse effects occur:  hearing changes, easy bruising/bleeding, severe headache, or vision changes.  The patient verbalized understanding of the proper use and possible adverse effects of sarecycline.  All of the patient's questions and concerns were addressed. Olanzapine Counseling- I discussed with the patient the common side effects of olanzapine including but are not limited to: lack of energy, dry mouth, increased appetite, sleepiness, tremor, constipation, dizziness, changes in behavior, or restlessness.  Explained that teenagers are more likely to experience headaches, abdominal pain, pain in the arms or legs, tiredness, and sleepiness.  Serious side effects include but are not limited: increased risk of death in elderly patients who are confused, have memory loss, or dementia-related psychosis; hyperglycemia; increased cholesterol and triglycerides; and weight gain. High Dose Vitamin A Pregnancy And Lactation Text: High dose vitamin A therapy is contraindicated during pregnancy and breast feeding. Xolair Pregnancy And Lactation Text: This medication is Pregnancy Category B and is considered safe during pregnancy. This medication is excreted in breast milk. Cephalexin Counseling: I counseled the patient regarding use of cephalexin as an antibiotic for prophylactic and/or therapeutic purposes. Cephalexin (commonly prescribed under brand name Keflex) is a cephalosporin antibiotic which is active against numerous classes of bacteria, including most skin bacteria. Side effects may include nausea, diarrhea, gastrointestinal upset, rash, hives, yeast infections, and in rare cases, hepatitis, kidney disease, seizures, fever, confusion, neurologic symptoms, and others. Patients with severe allergies to penicillin medications are cautioned that there is about a 10% incidence of cross-reactivity with cephalosporins. When possible, patients with penicillin allergies should use alternatives to cephalosporins for antibiotic therapy. Cimzia Counseling:  I discussed with the patient the risks of Cimzia including but not limited to immunosuppression, allergic reactions and infections.  The patient understands that monitoring is required including a PPD at baseline and must alert us or the primary physician if symptoms of infection or other concerning signs are noted. Winlevi Pregnancy And Lactation Text: This medication is considered safe during pregnancy and breastfeeding. Hydroxyzine Counseling: Patient advised that the medication is sedating and not to drive a car after taking this medication.  Patient informed of potential adverse effects including but not limited to dry mouth, urinary retention, and blurry vision.  The patient verbalized understanding of the proper use and possible adverse effects of hydroxyzine.  All of the patient's questions and concerns were addressed. Topical Metronidazole Counseling: Metronidazole is a topical antibiotic medication. You may experience burning, stinging, redness, or allergic reactions.  Please call our office if you develop any problems from using this medication. Glycopyrrolate Pregnancy And Lactation Text: This medication is Pregnancy Category B and is considered safe during pregnancy. It is unknown if it is excreted breast milk. Klisyri Counseling:  I discussed with the patient the risks of Klisyri including but not limited to erythema, scaling, itching, weeping, crusting, and pain. Calcipotriene Counseling:  I discussed with the patient the risks of calcipotriene including but not limited to erythema, scaling, itching, and irritation. SSKI Counseling:  I discussed with the patient the risks of SSKI including but not limited to thyroid abnormalities, metallic taste, GI upset, fever, headache, acne, arthralgias, paraesthesias, lymphadenopathy, easy bleeding, arrhythmias, and allergic reaction. Ilumya Counseling: I discussed with the patient the risks of tildrakizumab including but not limited to immunosuppression, malignancy, posterior leukoencephalopathy syndrome, and serious infections.  The patient understands that monitoring is required including a PPD at baseline and must alert us or the primary physician if symptoms of infection or other concerning signs are noted. Finasteride Male Counseling: Finasteride Counseling:  I discussed with the patient the risks of use of finasteride including but not limited to decreased libido, decreased ejaculate volume, gynecomastia, and depression. Women should not handle medication.  All of the patient's questions and concerns were addressed. Xeldaveyz Pregnancy And Lactation Text: This medication is Pregnancy Category D and is not considered safe during pregnancy.  The risk during breast feeding is also uncertain. Drysol Pregnancy And Lactation Text: This medication is considered safe during pregnancy and breast feeding. Cimzia Pregnancy And Lactation Text: This medication crosses the placenta but can be considered safe in certain situations. Cimzia may be excreted in breast milk. Olanzapine Pregnancy And Lactation Text: This medication is pregnancy category C.   There are no adequate and well controlled trials with olanzapine in pregnant females.  Olanzapine should be used during pregnancy only if the potential benefit justifies the potential risk to the fetus.   In a study in lactating healthy women, olanzapine was excreted in breast milk.  It is recommended that women taking olanzapine should not breast feed. VTAMA Counseling: I discussed with the patient that VTAMA is not for use in the eyes, mouth or mouth. They should call the office if they develop any signs of allergic reactions to VTAMA. The patient verbalized understanding of the proper use and possible adverse effects of VTAMA.  All of the patient's questions and concerns were addressed. Protopic Counseling: Patient may experience a mild burning sensation during topical application. Protopic is not approved in children less than 2 years of age. There have been case reports of hematologic and skin malignancies in patients using topical calcineurin inhibitors although causality is questionable. Ketoconazole Counseling:   Patient counseled regarding improving absorption with orange juice.  Adverse effects include but are not limited to breast enlargement, headache, diarrhea, nausea, upset stomach, liver function test abnormalities, taste disturbance, and stomach pain.  There is a rare possibility of liver failure that can occur when taking ketoconazole. The patient understands that monitoring of LFTs may be required, especially at baseline. The patient verbalized understanding of the proper use and possible adverse effects of ketoconazole.  All of the patient's questions and concerns were addressed. Arava Counseling:  Patient counseled regarding adverse effects of Arava including but not limited to nausea, vomiting, abnormalities in liver function tests. Patients may develop mouth sores, rash, diarrhea, and abnormalities in blood counts. The patient understands that monitoring is required including LFTs and blood counts.  There is a rare possibility of scarring of the liver and lung problems that can occur when taking methotrexate. Persistent nausea, loss of appetite, pale stools, dark urine, cough, and shortness of breath should be reported immediately. Patient advised to discontinue Arava treatment and consult with a physician prior to attempting conception. The patient will have to undergo a treatment to eliminate Arava from the body prior to conception. Sarecycline Pregnancy And Lactation Text: This medication is Pregnancy Category D and not consider safe during pregnancy. It is also excreted in breast milk. Soolantra Pregnancy And Lactation Text: This medication is Pregnancy Category C. This medication is considered safe during breast feeding. Hydroxychloroquine Counseling:  I discussed with the patient that a baseline ophthalmologic exam is needed at the start of therapy and every year thereafter while on therapy. A CBC may also be warranted for monitoring.  The side effects of this medication were discussed with the patient, including but not limited to agranulocytosis, aplastic anemia, seizures, rashes, retinopathy, and liver toxicity. Patient instructed to call the office should any adverse effect occur.  The patient verbalized understanding of the proper use and possible adverse effects of Plaquenil.  All the patient's questions and concerns were addressed. Cephalexin Pregnancy And Lactation Text: This medication is Pregnancy Category B and considered safe during pregnancy.  It is also excreted in breast milk but can be used safely for shorter doses. Xolair Counseling:  Patient informed of potential adverse effects including but not limited to fever, muscle aches, rash and allergic reactions.  The patient verbalized understanding of the proper use and possible adverse effects of Xolair.  All of the patient's questions and concerns were addressed. Cibinqo Counseling: I discussed with the patient the risks of Cibinqo therapy including but not limited to common cold, nausea, headache, cold sores, increased blood CPK levels, dizziness, UTIs, fatigue, acne, and vomitting. Live vaccines should be avoided.  This medication has been linked to serious infections; higher rate of mortality; malignancy and lymphoproliferative disorders; major adverse cardiovascular events; thrombosis; thrombocytopenia and lymphopenia; lipid elevations; and retinal detachment. Detail Level: Zone Klisyri Pregnancy And Lactation Text: It is unknown if this medication can harm a developing fetus or if it is excreted in breast milk. Erivedge Counseling- I discussed with the patient the risks of Erivedge including but not limited to nausea, vomiting, diarrhea, constipation, weight loss, changes in the sense of taste, decreased appetite, muscle spasms, and hair loss.  The patient verbalized understanding of the proper use and possible adverse effects of Erivedge.  All of the patient's questions and concerns were addressed. Hydroxyzine Pregnancy And Lactation Text: This medication is not safe during pregnancy and should not be taken. It is also excreted in breast milk and breast feeding isn't recommended. Topical Metronidazole Pregnancy And Lactation Text: This medication is Pregnancy Category B and considered safe during pregnancy.  It is also considered safe to use while breastfeeding. Sski Pregnancy And Lactation Text: This medication is Pregnancy Category D and isn't considered safe during pregnancy. It is excreted in breast milk. Oral Minoxidil Counseling- I discussed with the patient the risks of oral minoxidil including but not limited to shortness of breath, swelling of the feet or ankles, dizziness, lightheadedness, unwanted hair growth and allergic reaction.  The patient verbalized understanding of the proper use and possible adverse effects of oral minoxidil.  All of the patient's questions and concerns were addressed. Calcipotriene Pregnancy And Lactation Text: This medication has not been proven safe during pregnancy. It is unknown if this medication is excreted in breast milk. Topical Retinoid counseling:  Patient advised to apply a pea-sized amount only at bedtime and wait 30 minutes after washing their face before applying.  If too drying, patient may add a non-comedogenic moisturizer. The patient verbalized understanding of the proper use and possible adverse effects of retinoids.  All of the patient's questions and concerns were addressed. Cyclophosphamide Counseling:  I discussed with the patient the risks of cyclophosphamide including but not limited to hair loss, hormonal abnormalities, decreased fertility, abdominal pain, diarrhea, nausea and vomiting, bone marrow suppression and infection. The patient understands that monitoring is required while taking this medication. Finasteride Pregnancy And Lactation Text: This medication is absolutely contraindicated during pregnancy. It is unknown if it is excreted in breast milk. Elidel Counseling: Patient may experience a mild burning sensation during topical application. Elidel is not approved in children less than 2 years of age. There have been case reports of hematologic and skin malignancies in patients using topical calcineurin inhibitors although causality is questionable. Clindamycin Counseling: I counseled the patient regarding use of clindamycin as an antibiotic for prophylactic and/or therapeutic purposes. Clindamycin is active against numerous classes of bacteria, including skin bacteria. Side effects may include nausea, diarrhea, gastrointestinal upset, rash, hives, yeast infections, and in rare cases, colitis. Cosentyx Counseling:  I discussed with the patient the risks of Cosentyx including but not limited to worsening of Crohn's disease, immunosuppression, allergic reactions and infections.  The patient understands that monitoring is required including a PPD at baseline and must alert us or the primary physician if symptoms of infection or other concerning signs are noted. Cibinqo Pregnancy And Lactation Text: It is unknown if this medication will adversely affect pregnancy or breast feeding.  You should not take this medication if you are currently pregnant or planning a pregnancy or while breastfeeding. Vtama Pregnancy And Lactation Text: It is unknown if this medication can cause problems during pregnancy and breastfeeding. Tetracycline Counseling: Patient counseled regarding possible photosensitivity and increased risk for sunburn.  Patient instructed to avoid sunlight, if possible.  When exposed to sunlight, patients should wear protective clothing, sunglasses, and sunscreen.  The patient was instructed to call the office immediately if the following severe adverse effects occur:  hearing changes, easy bruising/bleeding, severe headache, or vision changes.  The patient verbalized understanding of the proper use and possible adverse effects of tetracycline.  All of the patient's questions and concerns were addressed. Patient understands to avoid pregnancy while on therapy due to potential birth defects. Aklief counseling:  Patient advised to apply a pea-sized amount only at bedtime and wait 30 minutes after washing their face before applying.  If too drying, patient may add a non-comedogenic moisturizer.  The most commonly reported side effects including irritation, redness, scaling, dryness, stinging, burning, itching, and increased risk of sunburn.  The patient verbalized understanding of the proper use and possible adverse effects of retinoids.  All of the patient's questions and concerns were addressed. Hydroxychloroquine Pregnancy And Lactation Text: This medication has been shown to cause fetal harm but it isn't assigned a Pregnancy Risk Category. There are small amounts excreted in breast milk. Protopic Pregnancy And Lactation Text: This medication is Pregnancy Category C. It is unknown if this medication is excreted in breast milk when applied topically. Ketoconazole Pregnancy And Lactation Text: This medication is Pregnancy Category C and it isn't know if it is safe during pregnancy. It is also excreted in breast milk and breast feeding isn't recommended. Acitretin Counseling:  I discussed with the patient the risks of acitretin including but not limited to hair loss, dry lips/skin/eyes, liver damage, hyperlipidemia, depression/suicidal ideation, photosensitivity.  Serious rare side effects can include but are not limited to pancreatitis, pseudotumor cerebri, bony changes, clot formation/stroke/heart attack.  Patient understands that alcohol is contraindicated since it can result in liver toxicity and significantly prolong the elimination of the drug by many years. Metronidazole Pregnancy And Lactation Text: This medication is Pregnancy Category B and considered safe during pregnancy.  It is also excreted in breast milk. Minoxidil Counseling: Minoxidil is a topical medication which can increase blood flow where it is applied. It is uncertain how this medication increases hair growth. Side effects are uncommon and include stinging and allergic reactions. Topical Steroids Counseling: I discussed with the patient that prolonged use of topical steroids can result in the increased appearance of superficial blood vessels (telangiectasias), lightening (hypopigmentation) and thinning of the skin (atrophy).  Patient understands to avoid using high potency steroids in skin folds, the groin or the face.  The patient verbalized understanding of the proper use and possible adverse effects of topical steroids.  All of the patient's questions and concerns were addressed. Infliximab Counseling:  I discussed with the patient the risks of infliximab including but not limited to myelosuppression, immunosuppression, autoimmune hepatitis, demyelinating diseases, lymphoma, and serious infections.  The patient understands that monitoring is required including a PPD at baseline and must alert us or the primary physician if symptoms of infection or other concerning signs are noted. Skyrizi Counseling: I discussed with the patient the risks of risankizumab-rzaa including but not limited to immunosuppression, and serious infections.  The patient understands that monitoring is required including a PPD at baseline and must alert us or the primary physician if symptoms of infection or other concerning signs are noted. Birth Control Pills Counseling: Birth Control Pill Counseling: I discussed with the patient the potential side effects of OCPs including but not limited to increased risk of stroke, heart attack, thrombophlebitis, deep venous thrombosis, hepatic adenomas, breast changes, GI upset, headaches, and depression.  The patient verbalized understanding of the proper use and possible adverse effects of OCPs. All of the patient's questions and concerns were addressed. Thalidomide Counseling: I discussed with the patient the risks of thalidomide including but not limited to birth defects, anxiety, weakness, chest pain, dizziness, cough and severe allergy. Clofazimine Counseling:  I discussed with the patient the risks of clofazimine including but not limited to skin and eye pigmentation, liver damage, nausea/vomiting, gastrointestinal bleeding and allergy. Albendazole Counseling:  I discussed with the patient the risks of albendazole including but not limited to cytopenia, kidney damage, nausea/vomiting and severe allergy.  The patient understands that this medication is being used in an off-label manner. Oral Minoxidil Pregnancy And Lactation Text: This medication should only be used when clearly needed if you are pregnant, attempting to become pregnant or breast feeding. Qbrexza Counseling:  I discussed with the patient the risks of Qbrexza including but not limited to headache, mydriasis, blurred vision, dry eyes, nasal dryness, dry mouth, dry throat, dry skin, urinary hesitation, and constipation.  Local skin reactions including erythema, burning, stinging, and itching can also occur. Aklief Pregnancy And Lactation Text: It is unknown if this medication is safe to use during pregnancy.  It is unknown if this medication is excreted in breast milk.  Breastfeeding women should use the topical cream on the smallest area of the skin for the shortest time needed while breastfeeding.  Do not apply to nipple and areola. Olumiant Counseling: I discussed with the patient the risks of Olumiant therapy including but not limited to upper respiratory tract infections, shingles, cold sores, and nausea. Live vaccines should be avoided.  This medication has been linked to serious infections; higher rate of mortality; malignancy and lymphoproliferative disorders; major adverse cardiovascular events; thrombosis; gastrointestinal perforations; neutropenia; lymphopenia; anemia; liver enzyme elevations; and lipid elevations. Clindamycin Pregnancy And Lactation Text: This medication can be used in pregnancy if certain situations. Clindamycin is also present in breast milk. Cyclophosphamide Pregnancy And Lactation Text: This medication is Pregnancy Category D and it isn't considered safe during pregnancy. This medication is excreted in breast milk. Topical Steroids Applications Pregnancy And Lactation Text: Most topical steroids are considered safe to use during pregnancy and lactation.  Any topical steroid applied to the breast or nipple should be washed off before breastfeeding. Low Dose Naltrexone Counseling- I discussed with the patient the potential risks and side effects of low dose naltrexone including but not limited to: more vivid dreams, headaches, nausea, vomiting, abdominal pain, fatigue, dizziness, and anxiety. Acitretin Pregnancy And Lactation Text: This medication is Pregnancy Category X and should not be given to women who are pregnant or may become pregnant in the future. This medication is excreted in breast milk. Minocycline Counseling: Patient advised regarding possible photosensitivity and discoloration of the teeth, skin, lips, tongue and gums.  Patient instructed to avoid sunlight, if possible.  When exposed to sunlight, patients should wear protective clothing, sunglasses, and sunscreen.  The patient was instructed to call the office immediately if the following severe adverse effects occur:  hearing changes, easy bruising/bleeding, severe headache, or vision changes.  The patient verbalized understanding of the proper use and possible adverse effects of minocycline.  All of the patient's questions and concerns were addressed. Zoryve Counseling:  I discussed with the patient that Zoryve is not for use in the eyes, mouth or vagina. The most commonly reported side effects include diarrhea, headache, insomnia, application site pain, upper respiratory tract infections, and urinary tract infections.  All of the patient's questions and concerns were addressed. Terbinafine Counseling: Patient counseling regarding adverse effects of terbinafine including but not limited to headache, diarrhea, rash, upset stomach, liver function test abnormalities, itching, taste/smell disturbance, nausea, abdominal pain, and flatulence.  There is a rare possibility of liver failure that can occur when taking terbinafine.  The patient understands that a baseline LFT and kidney function test may be required. The patient verbalized understanding of the proper use and possible adverse effects of terbinafine.  All of the patient's questions and concerns were addressed. Birth Control Pills Pregnancy And Lactation Text: This medication should be avoided if pregnant and for the first 30 days post-partum. Libtayo Counseling- I discussed with the patient the risks of Libtayo including but not limited to nausea, vomiting, diarrhea, and bone or muscle pain.  The patient verbalized understanding of the proper use and possible adverse effects of Libtayo.  All of the patient's questions and concerns were addressed. Eucrisa Counseling: Patient may experience a mild burning sensation during topical application. Eucrisa is not approved in children less than 2 years of age. Cantharidin Pregnancy And Lactation Text: The use of this medication during pregnancy or lactation is not recommended as there is insufficient data. Cyclosporine Counseling:  I discussed with the patient the risks of cyclosporine including but not limited to hypertension, gingival hyperplasia,myelosuppression, immunosuppression, liver damage, kidney damage, neurotoxicity, lymphoma, and serious infections. The patient understands that monitoring is required including baseline blood pressure, CBC, CMP, lipid panel and uric acid, and then 1-2 times monthly CMP and blood pressure. Otezla Counseling: The side effects of Otezla were discussed with the patient, including but not limited to worsening or new depression, weight loss, diarrhea, nausea, upper respiratory tract infection, and headache. Patient instructed to call the office should any adverse effect occur.  The patient verbalized understanding of the proper use and possible adverse effects of Otezla.  All the patient's questions and concerns were addressed. Tazorac Counseling:  Patient advised that medication is irritating and drying.  Patient may need to apply sparingly and wash off after an hour before eventually leaving it on overnight.  The patient verbalized understanding of the proper use and possible adverse effects of tazorac.  All of the patient's questions and concerns were addressed. Azelaic Acid Counseling: Patient counseled that medicine may cause skin irritation and to avoid applying near the eyes.  In the event of skin irritation, the patient was advised to reduce the amount of the drug applied or use it less frequently.   The patient verbalized understanding of the proper use and possible adverse effects of azelaic acid.  All of the patient's questions and concerns were addressed. Qbrexza Pregnancy And Lactation Text: There is no available data on Qbrexza use in pregnant women.  There is no available data on Qbrexza use in lactation. Low Dose Naltrexone Pregnancy And Lactation Text: Naltrexone is pregnancy category C.  There have been no adequate and well-controlled studies in pregnant women.  It should be used in pregnancy only if the potential benefit justifies the potential risk to the fetus.   Limited data indicates that naltrexone is minimally excreted into breastmilk. Olumiant Pregnancy And Lactation Text: Based on animal studies, Olumiant may cause embryo-fetal harm when administered to pregnant women.  The medication should not be used in pregnancy.  Breastfeeding is not recommended during treatment. Doxycycline Counseling:  Patient counseled regarding possible photosensitivity and increased risk for sunburn.  Patient instructed to avoid sunlight, if possible.  When exposed to sunlight, patients should wear protective clothing, sunglasses, and sunscreen.  The patient was instructed to call the office immediately if the following severe adverse effects occur:  hearing changes, easy bruising/bleeding, severe headache, or vision changes.  The patient verbalized understanding of the proper use and possible adverse effects of doxycycline.  All of the patient's questions and concerns were addressed. Dupixent Counseling: I discussed with the patient the risks of dupilumab including but not limited to eye infection and irritation, cold sores, injection site reactions, worsening of asthma, allergic reactions and increased risk of parasitic infection.  Live vaccines should be avoided while taking dupilumab. Dupilumab will also interact with certain medications such as warfarin and cyclosporine. The patient understands that monitoring is required and they must alert us or the primary physician if symptoms of infection or other concerning signs are noted. Fluconazole Counseling:  Patient counseled regarding adverse effects of fluconazole including but not limited to headache, diarrhea, nausea, upset stomach, liver function test abnormalities, taste disturbance, and stomach pain.  There is a rare possibility of liver failure that can occur when taking fluconazole.  The patient understands that monitoring of LFTs and kidney function test may be required, especially at baseline. The patient verbalized understanding of the proper use and possible adverse effects of fluconazole.  All of the patient's questions and concerns were addressed. Mirvaso Counseling: Mirvaso is a topical medication which can decrease superficial blood flow where applied. Side effects are uncommon and include stinging, redness and allergic reactions. Topical Sulfur Applications Counseling: Topical Sulfur Counseling: Patient counseled that this medication may cause skin irritation or allergic reactions.  In the event of skin irritation, the patient was advised to reduce the amount of the drug applied or use it less frequently.   The patient verbalized understanding of the proper use and possible adverse effects of topical sulfur application.  All of the patient's questions and concerns were addressed. Bexarotene Counseling:  I discussed with the patient the risks of bexarotene including but not limited to hair loss, dry lips/skin/eyes, liver abnormalities, hyperlipidemia, pancreatitis, depression/suicidal ideation, photosensitivity, drug rash/allergic reactions, hypothyroidism, anemia, leukopenia, infection, cataracts, and teratogenicity.  Patient understands that they will need regular blood tests to check lipid profile, liver function tests, white blood cell count, thyroid function tests and pregnancy test if applicable. Libtayo Pregnancy And Lactation Text: This medication is contraindicated in pregnancy and when breast feeding. Tranexamic Acid Counseling:  Patient advised of the small risk of bleeding problems with tranexamic acid. They were also instructed to call if they developed any nausea, vomiting or diarrhea. All of the patient's questions and concerns were addressed. Tazorac Pregnancy And Lactation Text: This medication is not safe during pregnancy. It is unknown if this medication is excreted in breast milk. 5-Fu Counseling: 5-Fluorouracil Counseling:  I discussed with the patient the risks of 5-fluorouracil including but not limited to erythema, scaling, itching, weeping, crusting, and pain. Spironolactone Counseling: Patient advised regarding risks of diarrhea, abdominal pain, hyperkalemia, birth defects (for female patients), liver toxicity and renal toxicity. The patient may need blood work to monitor liver and kidney function and potassium levels while on therapy. The patient verbalized understanding of the proper use and possible adverse effects of spironolactone.  All of the patient's questions and concerns were addressed. Stelara Counseling:  I discussed with the patient the risks of ustekinumab including but not limited to immunosuppression, malignancy, posterior leukoencephalopathy syndrome, and serious infections.  The patient understands that monitoring is required including a PPD at baseline and must alert us or the primary physician if symptoms of infection or other concerning signs are noted. Colchicine Counseling:  Patient counseled regarding adverse effects including but not limited to stomach upset (nausea, vomiting, stomach pain, or diarrhea).  Patient instructed to limit alcohol consumption while taking this medication.  Colchicine may reduce blood counts especially with prolonged use.  The patient understands that monitoring of kidney function and blood counts may be required, especially at baseline. The patient verbalized understanding of the proper use and possible adverse effects of colchicine.  All of the patient's questions and concerns were addressed. Dupixent Pregnancy And Lactation Text: This medication likely crosses the placenta but the risk for the fetus is uncertain. This medication is excreted in breast milk. Rinvoq Counseling: I discussed with the patient the risks of Rinvoq therapy including but not limited to upper respiratory tract infections, shingles, cold sores, bronchitis, nausea, cough, fever, acne, and headache. Live vaccines should be avoided.  This medication has been linked to serious infections; higher rate of mortality; malignancy and lymphoproliferative disorders; major adverse cardiovascular events; thrombosis; thrombocytopenia, anemia, and neutropenia; lipid elevations; liver enzyme elevations; and gastrointestinal perforations. Otezla Pregnancy And Lactation Text: This medication is Pregnancy Category C and it isn't known if it is safe during pregnancy. It is unknown if it is excreted in breast milk. Zyclara Counseling:  I discussed with the patient the risks of imiquimod including but not limited to erythema, scaling, itching, weeping, crusting, and pain.  Patient understands that the inflammatory response to imiquimod is variable from person to person and was educated regarded proper titration schedule.  If flu-like symptoms develop, patient knows to discontinue the medication and contact us. Rhofade Counseling: Rhofade is a topical medication which can decrease superficial blood flow where applied. Side effects are uncommon and include stinging, redness and allergic reactions. Rituxan Counseling:  I discussed with the patient the risks of Rituxan infusions. Side effects can include infusion reactions, severe drug rashes including mucocutaneous reactions, reactivation of latent hepatitis and other infections and rarely progressive multifocal leukoencephalopathy.  All of the patient's questions and concerns were addressed. Ivermectin Counseling:  Patient instructed to take medication on an empty stomach with a full glass of water.  Patient informed of potential adverse effects including but not limited to nausea, diarrhea, dizziness, itching, and swelling of the extremities or lymph nodes.  The patient verbalized understanding of the proper use and possible adverse effects of ivermectin.  All of the patient's questions and concerns were addressed. Niacinamide Counseling: I recommended taking niacin or niacinamide, also know as vitamin B3, twice daily. Recent evidence suggests that taking vitamin B3 (500 mg twice daily) can reduce the risk of actinic keratoses and non-melanoma skin cancers. Side effects of vitamin B3 include flushing and headache. Bexarotene Pregnancy And Lactation Text: This medication is Pregnancy Category X and should not be given to women who are pregnant or may become pregnant. This medication should not be used if you are breast feeding. Quinolones Counseling:  I discussed with the patient the risks of fluoroquinolones including but not limited to GI upset, allergic reaction, drug rash, diarrhea, dizziness, photosensitivity, yeast infections, liver function test abnormalities, tendonitis/tendon rupture. Doxycycline Pregnancy And Lactation Text: This medication is Pregnancy Category D and not consider safe during pregnancy. It is also excreted in breast milk but is considered safe for shorter treatment courses. Tranexamic Acid Pregnancy And Lactation Text: It is unknown if this medication is safe during pregnancy or breast feeding. Azithromycin Counseling:  I discussed with the patient the risks of azithromycin including but not limited to GI upset, allergic reaction, drug rash, diarrhea, and yeast infections.

## 2023-05-19 NOTE — OB PROVIDER H&P - [facility]_OBGYN_ALL_OB_NU
2 Headache Monitoring: I recommended monitoring the headaches for now. There is no evidence of increased intracranial pressure. They were instructed to call if the headaches are worsening.

## 2025-03-15 NOTE — ED PROVIDER NOTE - HISTORY ATTESTATION, MLM
IP MH Referral Acuity Rating Score (RARS)    LMHP complete at referral to IP MH, with DEC; and, daily while awaiting IP MH placement. Call score to PPS.  CRITERIA SCORING   New 71 HH and Involuntary for IP MH (not adolescent) 0/3   Boarding over 24 hours 0/1   Vulnerable adult at least 55+ with multiple co morbidities; or, Patient age 11 or under 0/1   Suicide ideation without relief of precipitating factors 1/1   Current plan for suicide 1/1   Current plan for homicide 0/1   Imminent risk or actual attempt to seriously harm another without relief of factors precipitating the attempt 1/1   Severe dysfunction in daily living (ex: complete neglect for self care, extreme disruption in vegetative function, extreme deterioration in social interactions) 1/1   Recent (last 2 weeks) or current physical aggression in the ED 0/1   Restraints or seclusion episode in ED 0/1   Verbal aggression, agitation, yelling, etc., while in the ED 0/1   Active psychosis with psychomotor agitation or catatonia 0/1   Need for constant or near constant redirection (from leaving, from others, etc).  0/1   Intrusive or disruptive behaviors 0/1   TOTAL 4      
Report called to TAY Robertson on inpatient unit [facility] [id number] [facility]   
I have reviewed and confirmed nurses' notes...